# Patient Record
Sex: MALE | HISPANIC OR LATINO | URBAN - METROPOLITAN AREA
[De-identification: names, ages, dates, MRNs, and addresses within clinical notes are randomized per-mention and may not be internally consistent; named-entity substitution may affect disease eponyms.]

---

## 2018-06-20 ENCOUNTER — EMERGENCY (EMERGENCY)
Facility: HOSPITAL | Age: 33
LOS: 1 days | Discharge: HOME | End: 2018-06-20
Admitting: EMERGENCY MEDICINE
Payer: COMMERCIAL

## 2018-06-20 VITALS
RESPIRATION RATE: 18 BRPM | DIASTOLIC BLOOD PRESSURE: 88 MMHG | TEMPERATURE: 98 F | WEIGHT: 220.02 LBS | OXYGEN SATURATION: 98 % | HEART RATE: 91 BPM | SYSTOLIC BLOOD PRESSURE: 130 MMHG

## 2018-06-20 PROCEDURE — 99284 EMERGENCY DEPT VISIT MOD MDM: CPT

## 2018-06-20 PROCEDURE — 99284 EMERGENCY DEPT VISIT MOD MDM: CPT | Mod: 25

## 2018-06-20 PROCEDURE — 96374 THER/PROPH/DIAG INJ IV PUSH: CPT

## 2018-06-20 RX ORDER — LIDOCAINE 4 G/100G
1 CREAM TOPICAL
Qty: 10 | Refills: 0 | OUTPATIENT
Start: 2018-06-20 | End: 2018-06-24

## 2018-06-20 RX ORDER — IBUPROFEN 200 MG
1 TABLET ORAL
Qty: 21 | Refills: 0 | OUTPATIENT
Start: 2018-06-20 | End: 2018-06-26

## 2018-06-20 RX ORDER — CYCLOBENZAPRINE HYDROCHLORIDE 10 MG/1
10 TABLET, FILM COATED ORAL ONCE
Qty: 0 | Refills: 0 | Status: COMPLETED | OUTPATIENT
Start: 2018-06-20 | End: 2018-06-20

## 2018-06-20 RX ORDER — CYCLOBENZAPRINE HYDROCHLORIDE 10 MG/1
1 TABLET, FILM COATED ORAL
Qty: 10 | Refills: 0 | OUTPATIENT
Start: 2018-06-20 | End: 2018-06-24

## 2018-06-20 RX ORDER — KETOROLAC TROMETHAMINE 30 MG/ML
30 SYRINGE (ML) INJECTION ONCE
Qty: 0 | Refills: 0 | Status: DISCONTINUED | OUTPATIENT
Start: 2018-06-20 | End: 2018-06-20

## 2018-06-20 RX ORDER — LIDOCAINE 4 G/100G
1 CREAM TOPICAL ONCE
Qty: 0 | Refills: 0 | Status: COMPLETED | OUTPATIENT
Start: 2018-06-20 | End: 2018-06-20

## 2018-06-20 RX ADMIN — CYCLOBENZAPRINE HYDROCHLORIDE 10 MILLIGRAM(S): 10 TABLET, FILM COATED ORAL at 14:53

## 2018-06-20 RX ADMIN — Medication 30 MILLIGRAM(S): at 14:55

## 2018-06-20 RX ADMIN — LIDOCAINE 1 PATCH: 4 CREAM TOPICAL at 15:27

## 2018-06-20 NOTE — ED ADULT NURSE NOTE - OBJECTIVE STATEMENT
pt felt a pop in his neck last Monday when he was brushing his teeth and felt a sudden weakness in his right arm , it lasted a short period but this am was sleeping on a bus and afterwards had pain in right shoulder and pain in right upper back with tingling in right arm

## 2018-06-20 NOTE — ED PROVIDER NOTE - MEDICAL DECISION MAKING DETAILS
31 yo male in the ER with localized right upper back pain, reproducible and worse with movement.   no known injury, no neurovascular deficits, VSS, improved after muscle relaxant and NSAID's given.   Suspect musculoskeletal etiology. pt stable for discharge. returned precautions given.

## 2018-06-20 NOTE — ED PROVIDER NOTE - OBJECTIVE STATEMENT
31 yo male, w/o any significant PMH and not on any medications, in the Er with sharp, intermittent pain to huis right upper back, along the scapula line. Pt reports pain started last week, was improving slightly , but today he fell a sleep on the bus, with his neck down, when woke up and turned-felt sharp pain again. Pain radiate to his neck muscle and neck movement exacerbates pain, as well as movement of his right shoulder or right arm. Pt denies any numbness, tingling or weakness to his right UE, denies any SOB, chest pain or discomfort. Pt denies any [prior back or neck injury.

## 2018-06-20 NOTE — ED PROVIDER NOTE - MUSCULOSKELETAL, MLM
Spine appears normal, range of motion is not limited, no localized point of tenderness to cervical or thoracis spine, NROM, tenderness along the right scapula, NROM to right UE, no right shoulder deformity,

## 2018-06-24 DIAGNOSIS — M54.6 PAIN IN THORACIC SPINE: ICD-10-CM

## 2021-01-27 DIAGNOSIS — Z23 ENCOUNTER FOR IMMUNIZATION: ICD-10-CM

## 2021-02-10 ENCOUNTER — IMMUNIZATIONS (OUTPATIENT)
Dept: FAMILY MEDICINE CLINIC | Facility: HOSPITAL | Age: 36
End: 2021-02-10

## 2021-02-10 DIAGNOSIS — Z23 ENCOUNTER FOR IMMUNIZATION: Primary | ICD-10-CM

## 2021-02-10 PROCEDURE — 0011A SARS-COV-2 / COVID-19 MRNA VACCINE (MODERNA) 100 MCG: CPT

## 2021-02-10 PROCEDURE — 91301 SARS-COV-2 / COVID-19 MRNA VACCINE (MODERNA) 100 MCG: CPT

## 2021-03-12 ENCOUNTER — IMMUNIZATIONS (OUTPATIENT)
Dept: FAMILY MEDICINE CLINIC | Facility: HOSPITAL | Age: 36
End: 2021-03-12

## 2021-03-12 DIAGNOSIS — Z23 ENCOUNTER FOR IMMUNIZATION: Primary | ICD-10-CM

## 2021-03-12 PROCEDURE — 91301 SARS-COV-2 / COVID-19 MRNA VACCINE (MODERNA) 100 MCG: CPT

## 2021-03-12 PROCEDURE — 0012A SARS-COV-2 / COVID-19 MRNA VACCINE (MODERNA) 100 MCG: CPT

## 2021-06-03 ENCOUNTER — OFFICE VISIT (OUTPATIENT)
Dept: URGENT CARE | Facility: CLINIC | Age: 36
End: 2021-06-03
Payer: COMMERCIAL

## 2021-06-03 VITALS
RESPIRATION RATE: 16 BRPM | TEMPERATURE: 97.4 F | BODY MASS INDEX: 31.64 KG/M2 | HEIGHT: 71 IN | OXYGEN SATURATION: 99 % | SYSTOLIC BLOOD PRESSURE: 113 MMHG | HEART RATE: 73 BPM | DIASTOLIC BLOOD PRESSURE: 59 MMHG | WEIGHT: 226 LBS

## 2021-06-03 DIAGNOSIS — H10.31 ACUTE CONJUNCTIVITIS OF RIGHT EYE, UNSPECIFIED ACUTE CONJUNCTIVITIS TYPE: Primary | ICD-10-CM

## 2021-06-03 DIAGNOSIS — J30.9 ALLERGIC RHINITIS, UNSPECIFIED SEASONALITY, UNSPECIFIED TRIGGER: ICD-10-CM

## 2021-06-03 PROCEDURE — G0382 LEV 3 HOSP TYPE B ED VISIT: HCPCS | Performed by: NURSE PRACTITIONER

## 2021-06-03 RX ORDER — POLYMYXIN B SULFATE AND TRIMETHOPRIM 1; 10000 MG/ML; [USP'U]/ML
1 SOLUTION OPHTHALMIC EVERY 4 HOURS
Qty: 10 ML | Refills: 0 | Status: SHIPPED | OUTPATIENT
Start: 2021-06-03

## 2021-06-03 RX ORDER — FLUTICASONE PROPIONATE 50 MCG
2 SPRAY, SUSPENSION (ML) NASAL DAILY
COMMUNITY
Start: 2020-11-23 | End: 2021-11-23

## 2021-06-03 NOTE — PROGRESS NOTES
[facility]        NAME: Ghanshyam Salcido is a 30 y o  male  : 1985    MRN: [id number]  DATE: Leonie 3, 2021  TIME: 9:23 AM    Assessment and Plan   Acute conjunctivitis of right eye, unspecified acute conjunctivitis type [H10 31]  1  Acute conjunctivitis of right eye, unspecified acute conjunctivitis type  polymyxin b-trimethoprim (POLYTRIM) ophthalmic solution   2  Allergic rhinitis, unspecified seasonality, unspecified trigger           Patient Instructions     --Use Rx eye drops x 5-7 days as prescribed  No contact use during this time  --Warm compresses  --OTC antihistamine-decongestant (Claritin-D, etc)  --OTC saline nasal spray during day and steroid nasal spray (Flonase, etc) at bedtime  --Contagion issues discussed  Stay home from work at least 24 hours/  --Seek re-evalualtion if no improvement/worsening over the next 48-72 hours  Chief Complaint     Chief Complaint   Patient presents with    Eye Pain     right eye feels idalia/rubbing/white bump inside eye/green discharge x 2 days, now left eye feels irritated    Sinusitis     sinus pressure x last night, sneezing, back of throat is dry         History of Present Illness         Here with complaints of right eye irritated, red x 2 days  Minimal itching  Some green exudate noted, as well as some tearing  Left eye mildly irritated also, but no redness/drainage noted here  No known FB, however, may have contaminated his eye 3 days ago when he took his contacts out  Hasn't worn them since  Nasal/sinus congestion, green rhinorrhea since yesterday  Some sneezing  No fever, cough, sore throat, ear pain, anosmia  Admits to history of spring allergies  No OTC meds taken  Review of Systems   Review of Systems   Constitutional: Negative for fever  HENT: Positive for rhinorrhea and sinus pressure  Negative for ear pain and sinus pain  Eyes: Positive for discharge and redness   Negative for photophobia and visual disturbance  Respiratory: Negative for cough  Neurological: Negative for headaches  Current Medications       Current Outpatient Medications:     fluticasone (FLONASE) 50 mcg/act nasal spray, 2 sprays into each nostril daily, Disp: , Rfl:     polymyxin b-trimethoprim (POLYTRIM) ophthalmic solution, Administer 1 drop to the right eye every 4 (four) hours, Disp: 10 mL, Rfl: 0    Current Allergies     Allergies as of 06/03/2021    (No Known Allergies)            The following portions of the patient's history were reviewed and updated as appropriate: allergies, current medications, past family history, past medical history, past social history, past surgical history and problem list      No past medical history on file  No past surgical history on file  No family history on file  Medications have been verified  Objective   /59   Pulse 73   Temp (!) 97 4 °F (36 3 °C)   Resp 16   Ht 5' 11" (1 803 m)   Wt 103 kg (226 lb)   SpO2 99%   BMI 31 52 kg/m²   No LMP for male patient  Physical Exam     Physical Exam  Constitutional:       General: He is not in acute distress  Appearance: He is well-developed  He is not diaphoretic  HENT:      Head: Normocephalic and atraumatic  Right Ear: External ear normal       Left Ear: External ear normal       Nose: Congestion and rhinorrhea present  Comments: Sinus pressure, but no appreciable tenderness at this time  Mouth/Throat:      Pharynx: No oropharyngeal exudate or posterior oropharyngeal erythema  Comments: Tonsils pink, 2-3+ (baseline, per patient)  No exudate  Eyes:      General:         Right eye: Discharge present  Left eye: No discharge  Extraocular Movements: Extraocular movements intact  Pupils: Pupils are equal, round, and reactive to light  Comments: Right eye with mild conjunctival and peripheral scleral injection with small amount of tearing noted   No crusting or purulent drainage at this time  Small hordeolum with surrounding erythema starting to form at margin of lower medial lid  Remainder of lid non-tender, with normal appearance  No ciliary flush  Left eye with normal appearance  Cardiovascular:      Rate and Rhythm: Normal rate and regular rhythm  Heart sounds: Normal heart sounds  Pulmonary:      Effort: Pulmonary effort is normal  No respiratory distress  Breath sounds: Normal breath sounds  No stridor  No wheezing, rhonchi or rales  Chest:      Chest wall: No tenderness  Lymphadenopathy:      Cervical: No cervical adenopathy  Skin:     General: Skin is warm and dry  Findings: No rash  Neurological:      Mental Status: He is alert and oriented to person, place, and time

## 2021-06-03 NOTE — PATIENT INSTRUCTIONS
--Use Rx eye drops x 5-7 days as prescribed  No contact use during this time  --Warm compresses  --OTC antihistamine-decongestant (Claritin-D, etc)  --OTC saline nasal spray during day and steroid nasal spray (Flonase, etc) at bedtime  --Contagion issues discussed  Stay home from work at least 24 hours/  --Seek re-evalualtion if no improvement/worsening over the next 48-72 hours

## 2021-06-03 NOTE — LETTER
Leonie 3, 2021     Patient: Laura Betancourt   YOB: 1985   Date of Visit: 6/3/2021       To Whom it May Concern:    Laura Betancourt was seen in my clinic on 6/3/2021  Please excuse from work today (6/3) and tomorrow (6/4) due to medical condition  If you have any questions or concerns, please don't hesitate to call           Sincerely,          BE DARRYL JONES        CC: Laura Betancourt

## 2021-08-12 ENCOUNTER — HOSPITAL ENCOUNTER (EMERGENCY)
Facility: HOSPITAL | Age: 36
Discharge: HOME | End: 2021-08-12
Attending: EMERGENCY MEDICINE | Admitting: EMERGENCY MEDICINE
Payer: COMMERCIAL

## 2021-08-12 ENCOUNTER — APPOINTMENT (EMERGENCY)
Dept: VASCULAR ULTRASOUND | Facility: HOSPITAL | Age: 36
End: 2021-08-12
Payer: COMMERCIAL

## 2021-08-12 ENCOUNTER — OFFICE VISIT (OUTPATIENT)
Dept: URGENT CARE | Facility: CLINIC | Age: 36
End: 2021-08-12
Payer: COMMERCIAL

## 2021-08-12 VITALS
HEIGHT: 70 IN | WEIGHT: 217 LBS | DIASTOLIC BLOOD PRESSURE: 56 MMHG | TEMPERATURE: 97 F | HEART RATE: 71 BPM | BODY MASS INDEX: 31.07 KG/M2 | RESPIRATION RATE: 16 BRPM | SYSTOLIC BLOOD PRESSURE: 109 MMHG

## 2021-08-12 VITALS
SYSTOLIC BLOOD PRESSURE: 126 MMHG | TEMPERATURE: 98.8 F | RESPIRATION RATE: 16 BRPM | BODY MASS INDEX: 31.42 KG/M2 | DIASTOLIC BLOOD PRESSURE: 80 MMHG | HEART RATE: 91 BPM | OXYGEN SATURATION: 99 % | WEIGHT: 219 LBS

## 2021-08-12 DIAGNOSIS — S40.022A CONTUSION OF LEFT UPPER EXTREMITY, INITIAL ENCOUNTER: ICD-10-CM

## 2021-08-12 DIAGNOSIS — R59.1 LYMPHADENOPATHY: Primary | ICD-10-CM

## 2021-08-12 DIAGNOSIS — M79.602 PAIN OF LEFT UPPER EXTREMITY: ICD-10-CM

## 2021-08-12 DIAGNOSIS — T14.8XXA CONTUSION: ICD-10-CM

## 2021-08-12 DIAGNOSIS — R59.0 AXILLARY ADENOPATHY: Primary | ICD-10-CM

## 2021-08-12 DIAGNOSIS — M79.602 LEFT ARM PAIN: ICD-10-CM

## 2021-08-12 LAB
ALBUMIN SERPL BCP-MCNC: 4.2 G/DL (ref 3.4–4.8)
ALP SERPL-CCNC: 58.7 U/L (ref 10–129)
ALT SERPL W P-5'-P-CCNC: 15 U/L (ref 5–63)
ANION GAP SERPL CALCULATED.3IONS-SCNC: 7 MMOL/L (ref 4–13)
APTT PPP: 32 SECONDS (ref 23–31)
AST SERPL W P-5'-P-CCNC: 17 U/L (ref 15–41)
BASOPHILS # BLD AUTO: 0.04 THOUSANDS/ΜL (ref 0–0.1)
BASOPHILS NFR BLD AUTO: 0 % (ref 0–1)
BILIRUB SERPL-MCNC: 0.57 MG/DL (ref 0.3–1.2)
BUN SERPL-MCNC: 15 MG/DL (ref 6–20)
CALCIUM SERPL-MCNC: 9.2 MG/DL (ref 8.4–10.2)
CHLORIDE SERPL-SCNC: 103 MMOL/L (ref 96–108)
CO2 SERPL-SCNC: 27 MMOL/L (ref 22–33)
CREAT SERPL-MCNC: 0.93 MG/DL (ref 0.5–1.2)
EOSINOPHIL # BLD AUTO: 0.2 THOUSAND/ΜL (ref 0–0.61)
EOSINOPHIL NFR BLD AUTO: 2 % (ref 0–6)
ERYTHROCYTE [DISTWIDTH] IN BLOOD BY AUTOMATED COUNT: 13.7 % (ref 11.6–15.1)
GFR SERPL CREATININE-BSD FRML MDRD: 106 ML/MIN/1.73SQ M
GLUCOSE SERPL-MCNC: 131 MG/DL (ref 65–140)
HCT VFR BLD AUTO: 43.2 % (ref 36.5–49.3)
HGB BLD-MCNC: 14.5 G/DL (ref 12–17)
IMM GRANULOCYTES # BLD AUTO: 0.07 THOUSAND/UL (ref 0–0.2)
IMM GRANULOCYTES NFR BLD AUTO: 1 % (ref 0–2)
INR PPP: 0.96 (ref 0.9–1.1)
LYMPHOCYTES # BLD AUTO: 2.4 THOUSANDS/ΜL (ref 0.6–4.47)
LYMPHOCYTES NFR BLD AUTO: 24 % (ref 14–44)
MCH RBC QN AUTO: 31 PG (ref 26.8–34.3)
MCHC RBC AUTO-ENTMCNC: 33.6 G/DL (ref 31.4–37.4)
MCV RBC AUTO: 93 FL (ref 82–98)
MONOCYTES # BLD AUTO: 0.74 THOUSAND/ΜL (ref 0.17–1.22)
MONOCYTES NFR BLD AUTO: 7 % (ref 4–12)
NEUTROPHILS # BLD AUTO: 6.77 THOUSANDS/ΜL (ref 1.85–7.62)
NEUTS SEG NFR BLD AUTO: 66 % (ref 43–75)
PLATELET # BLD AUTO: 272 THOUSANDS/UL (ref 149–390)
PMV BLD AUTO: 10.5 FL (ref 8.9–12.7)
POTASSIUM SERPL-SCNC: 4.1 MMOL/L (ref 3.5–5)
PROT SERPL-MCNC: 7.2 G/DL (ref 6.4–8.3)
PROTHROMBIN TIME: 10.9 SECONDS (ref 9.5–12.1)
RBC # BLD AUTO: 4.67 MILLION/UL (ref 3.88–5.62)
SODIUM SERPL-SCNC: 137 MMOL/L (ref 133–145)
WBC # BLD AUTO: 10.22 THOUSAND/UL (ref 4.31–10.16)

## 2021-08-12 PROCEDURE — 85025 COMPLETE CBC W/AUTO DIFF WBC: CPT | Performed by: EMERGENCY MEDICINE

## 2021-08-12 PROCEDURE — 93971 EXTREMITY STUDY: CPT

## 2021-08-12 PROCEDURE — 85610 PROTHROMBIN TIME: CPT | Performed by: EMERGENCY MEDICINE

## 2021-08-12 PROCEDURE — 99282 EMERGENCY DEPT VISIT SF MDM: CPT | Performed by: EMERGENCY MEDICINE

## 2021-08-12 PROCEDURE — 93971 EXTREMITY STUDY: CPT | Performed by: SURGERY

## 2021-08-12 PROCEDURE — G0382 LEV 3 HOSP TYPE B ED VISIT: HCPCS | Performed by: NURSE PRACTITIONER

## 2021-08-12 PROCEDURE — 36415 COLL VENOUS BLD VENIPUNCTURE: CPT | Performed by: EMERGENCY MEDICINE

## 2021-08-12 PROCEDURE — 80053 COMPREHEN METABOLIC PANEL: CPT | Performed by: EMERGENCY MEDICINE

## 2021-08-12 PROCEDURE — 85730 THROMBOPLASTIN TIME PARTIAL: CPT | Performed by: EMERGENCY MEDICINE

## 2021-08-12 PROCEDURE — 99284 EMERGENCY DEPT VISIT MOD MDM: CPT

## 2021-08-12 NOTE — PROGRESS NOTES
[facility]        NAME: Michell Smiley is a 29 y o  male  : 1985    MRN: [id number]  DATE: 2021  TIME: 2:08 PM    Assessment and Plan   Axillary adenopathy [R59 0]  1  Axillary adenopathy     2  Contusion of left upper extremity, initial encounter     3  Pain of left upper extremity  Ambulatory Referral to Emergency Medicine    Transfer to other facility         Patient Instructions     --Advise f/u with PCP within the next 1-2 days for further evaluation and testing including possible bloodwork and ultrasound  Go to ER if unable to secure appointment during this time frame and/or worsening symptoms  Chief Complaint     Chief Complaint   Patient presents with    Mass     under L armpit, discomfort, started 2 weeks ago  Now today has one on inner L upper arm  History of Present Illness       Here with complaints of tender "lumps" under left arm pit x 2 weeks  Tender bruised area noted on left upper inner arm since yesterday  Remainder of arm starting to feel somewhat painful also  Tired, but no associated fever/chills, headache, body aches, swollen glands elsewhere including right arm  No Motrin, ice/heat  No known bites, stings, trauma  No cat scratches  Had COVID vaccine this past spring  Review of Systems   Review of Systems   Constitutional: Negative for fever     Musculoskeletal:        Per HPI   Skin:        Per HPI         Current Medications       Current Outpatient Medications:     fluticasone (FLONASE) 50 mcg/act nasal spray, 2 sprays into each nostril daily (Patient not taking: Reported on 2021), Disp: , Rfl:     polymyxin b-trimethoprim (POLYTRIM) ophthalmic solution, Administer 1 drop to the right eye every 4 (four) hours (Patient not taking: Reported on 2021), Disp: 10 mL, Rfl: 0    Current Allergies     Allergies as of 2021    (No Known Allergies)            The following portions of the patient's history were reviewed and updated as appropriate: allergies, current medications, past family history, past medical history, past social history, past surgical history and problem list      No past medical history on file  No past surgical history on file  No family history on file  Medications have been verified  Objective   /56 (Patient Position: Sitting)   Pulse 71   Temp (!) 97 °F (36 1 °C) (Temporal)   Resp 16   Ht 5' 10" (1 778 m)   Wt 98 4 kg (217 lb)   BMI 31 14 kg/m²   No LMP for male patient  Physical Exam     Physical Exam  Constitutional:       General: He is not in acute distress  Appearance: He is not ill-appearing, toxic-appearing or diaphoretic  Pulmonary:      Effort: Pulmonary effort is normal    Musculoskeletal:      Comments: Left axillary adenopathy and tenderness noted with no overlying skin changes  Left upper inner biceps region with 3-4 cm ovoid area of pronounced bruising with tender, 5 mm SQ nodule in center with overlying yellowish coloration  No FB, bite barks, drainage noted  Remainder of left arm non-tender with normal appearance  Neurological:      Mental Status: He is alert     Psychiatric:         Mood and Affect: Mood normal

## 2021-08-12 NOTE — PATIENT INSTRUCTIONS
--Advise f/u with PCP within the next 1-2 days for further evaluation and testing including possible bloodwork and ultrasound  Go to ER if unable to secure appointment during this time frame and/or worsening symptoms

## 2021-08-15 NOTE — ED PROVIDER NOTES
History  Chief Complaint   Patient presents with    Arm Pain     Pt reports lumps under his left axilliary  Pt reports overnight started with a bruise there also  This is a 45-year-old male presents the emergency department complaining pain underneath his right armpit  Patient states over last few weeks he has noticed bumps under the armpit  It has become more painful  He states the pain is located under the armpit does not radiate  Nothing makes it better or worse  He has not tried any make it better or worse  He states the pain is mild in nature and sharp  He also notes that there is a spot on his left biceps that is now bruised has a spot inside of  Patient denies chest pain or trouble breathing  He denies nausea or vomiting  He denies a fever  Prior to Admission Medications   Prescriptions Last Dose Informant Patient Reported? Taking?   fluticasone (FLONASE) 50 mcg/act nasal spray  Self Yes No   Si sprays into each nostril daily   Patient not taking: Reported on 2021   polymyxin b-trimethoprim (POLYTRIM) ophthalmic solution  Self No No   Sig: Administer 1 drop to the right eye every 4 (four) hours   Patient not taking: Reported on 2021      Facility-Administered Medications: None       History reviewed  No pertinent past medical history  Past Surgical History:   Procedure Laterality Date    FRACTURE SURGERY Left     tib/fib       History reviewed  No pertinent family history  I have reviewed and agree with the history as documented      E-Cigarette/Vaping    E-Cigarette Use Never User      E-Cigarette/Vaping Substances     Social History     Tobacco Use    Smoking status: Current Every Day Smoker     Packs/day: 0 50     Types: Cigarettes    Smokeless tobacco: Never Used   Vaping Use    Vaping Use: Never used   Substance Use Topics    Alcohol use: Yes     Comment: few times a month    Drug use: Yes     Types: Marijuana       Review of Systems   All other systems reviewed and are negative  Physical Exam  Physical Exam  Constitutional:  Vital signs reviewed, patient appears nontoxic, no acute distress  Eyes: Pupils equal round reactive to light and accommodation, extraocular muscles intact  HEENT: trachea midline, no JVD, moist mucous membranes  Respiratory: lung sounds clear throughout, no rhonchi, no rales  Cardiovascular: regular rate rhythm, no murmurs or rubs  Abdomen: soft, nontender, nondistended, no rebound or guarding  Back: no CVA tenderness, normal inspection  Extremities: no edema, pulses equal in all 4 extremities  Neuro: awake, alert, oriented, no focal weakness  Skin: warm, dry, intact, 2 cm hematoma to the left biceps with a center lump    Positive lymphadenopathy in the axillary region    Vital Signs  ED Triage Vitals [08/12/21 1525]   Temperature Pulse Respirations Blood Pressure SpO2   98 8 °F (37 1 °C) 91 16 126/80 99 %      Temp Source Heart Rate Source Patient Position - Orthostatic VS BP Location FiO2 (%)   Oral Monitor -- -- --      Pain Score       --           Vitals:    08/12/21 1525   BP: 126/80   Pulse: 91         Visual Acuity      ED Medications  Medications - No data to display    Diagnostic Studies  Results Reviewed     Procedure Component Value Units Date/Time    Protime-INR [[id number]]  (Normal) Collected: 08/12/21 1618    Lab Status: Final result Specimen: Blood from Arm, Right Updated: 08/12/21 1651     Protime 10 9 seconds      INR 0 96    Narrative:      INR Reference Ranges:  No Anticoagulant, Normal:           0 9-1 1  Standard Dose, Oral Anticoagulant:  2 0-3 0  High Dose, Oral Anticoagulant:      2 5-3 5    APTT [[id number]]  (Abnormal) Collected: 08/12/21 1618    Lab Status: Final result Specimen: Blood from Arm, Right Updated: 08/12/21 1651     PTT 32 seconds     Comprehensive metabolic panel [[id number]] Collected: 08/12/21 1618    Lab Status: Final result Specimen: Blood from Arm, Right Updated: 08/12/21 1643     Sodium 137 mmol/L      Potassium 4 1 mmol/L      Chloride 103 mmol/L      CO2 27 mmol/L      ANION GAP 7 mmol/L      BUN 15 mg/dL      Creatinine 0 93 mg/dL      Glucose 131 mg/dL      Calcium 9 2 mg/dL      AST 17 U/L      ALT 15 U/L      Alkaline Phosphatase 58 7 U/L      Total Protein 7 2 g/dL      Albumin 4 2 g/dL      Total Bilirubin 0 57 mg/dL      eGFR 106 ml/min/1 73sq m     Narrative:      National Kidney Disease Foundation guidelines for Chronic Kidney Disease (CKD):     Stage 1 with normal or high GFR (GFR > 90 mL/min/1 73 square meters)    Stage 2 Mild CKD (GFR = 60-89 mL/min/1 73 square meters)    Stage 3A Moderate CKD (GFR = 45-59 mL/min/1 73 square meters)    Stage 3B Moderate CKD (GFR = 30-44 mL/min/1 73 square meters)    Stage 4 Severe CKD (GFR = 15-29 mL/min/1 73 square meters)    Stage 5 End Stage CKD (GFR <15 mL/min/1 73 square meters)  Note: GFR calculation is accurate only with a steady state creatinine    CBC and differential [[id number]]  (Abnormal) Collected: 08/12/21 1618    Lab Status: Final result Specimen: Blood from Arm, Right Updated: 08/12/21 1624     WBC 10 22 Thousand/uL      RBC 4 67 Million/uL      Hemoglobin 14 5 g/dL      Hematocrit 43 2 %      MCV 93 fL      MCH 31 0 pg      MCHC 33 6 g/dL      RDW 13 7 %      MPV 10 5 fL      Platelets 983 Thousands/uL      Neutrophils Relative 66 %      Immat GRANS % 1 %      Lymphocytes Relative 24 %      Monocytes Relative 7 %      Eosinophils Relative 2 %      Basophils Relative 0 %      Neutrophils Absolute 6 77 Thousands/µL      Immature Grans Absolute 0 07 Thousand/uL      Lymphocytes Absolute 2 40 Thousands/µL      Monocytes Absolute 0 74 Thousand/µL      Eosinophils Absolute 0 20 Thousand/µL      Basophils Absolute 0 04 Thousands/µL                  VAS upper limb venous duplex scan, unilateral/limited   Final Result by Sourav Rodriguez MD (08/12 1467)                 Procedures  Procedures         ED Course  ED Course as of Aug 15 0616   Thu Aug 12, 2021   1626 As per the vascular technician is read:  "No acute DVT"                                              MDM  Number of Diagnoses or Management Options  Contusion  Left arm pain  Lymphadenopathy  Diagnosis management comments: This is a 29year old male who presented to the ED with L arm pain and bruise  I considered DVT, bruise, lymphadenopathy  These and other diagnoses were considered  Amount and/or Complexity of Data Reviewed  Clinical lab tests: reviewed and ordered  Tests in the radiology section of CPT®: reviewed and ordered  Discussion of test results with the performing providers: yes (Vascular tech  )        Disposition  Final diagnoses:   Left arm pain   Lymphadenopathy   Contusion     Time reflects when diagnosis was documented in both MDM as applicable and the Disposition within this note     Time User Action Codes Description Comment    8/12/2021  5:53 PM Alben Madina Add [M86 846] Left arm pain     8/12/2021  5:53 PM Alben Madina Add [R59 1] Lymphadenopathy     8/12/2021  5:53 PM Alben Madina Modify [Z32 700] Left arm pain     8/12/2021  5:53 PM Alben Madina Modify [R59 1] Lymphadenopathy     8/12/2021  5:53 PM Faccio, Constancia Brenna  8XXA] Contusion       ED Disposition     ED Disposition Condition Date/Time Comment    Discharge Stable Thu Aug 12, 2021  5:53 PM Shannon Going discharge to home/self care              Follow-up Information     Follow up With Specialties Details Why Contact Info Additional [address]  Emergency Department Emergency Medicine  If symptoms worsen [address] [facility],[address] [id number]  [address] [place] [address] Emergency Department, [address] [place] [address]    [facility]  Schedule an appointment as soon as possible for a visit in 2 days For PCP appointment [phone]             Discharge Medication List as of 8/12/2021  5:54 PM      CONTINUE these medications which have NOT CHANGED    Details   fluticasone (FLONASE) 50 mcg/act nasal spray 2 sprays into each nostril daily, Starting Mon 11/23/2020, Until Tue 11/23/2021, Historical Med      polymyxin b-trimethoprim (POLYTRIM) ophthalmic solution Administer 1 drop to the right eye every 4 (four) hours, Starting Thu 6/3/2021, Normal           No discharge procedures on file      PDMP Review     None          ED Provider  Electronically Signed by           Diomedes Hernandez DO  08/15/21 0457

## 2021-12-29 ENCOUNTER — IMMUNIZATIONS (OUTPATIENT)
Dept: FAMILY MEDICINE CLINIC | Facility: HOSPITAL | Age: 36
End: 2021-12-29

## 2021-12-29 DIAGNOSIS — Z23 ENCOUNTER FOR IMMUNIZATION: Primary | ICD-10-CM

## 2021-12-29 PROCEDURE — 0064A COVID-19 MODERNA VACC 0.25 ML BOOSTER: CPT

## 2021-12-29 PROCEDURE — 91306 COVID-19 MODERNA VACC 0.25 ML BOOSTER: CPT

## 2022-11-16 ENCOUNTER — OFFICE VISIT (OUTPATIENT)
Dept: URGENT CARE | Facility: CLINIC | Age: 37
End: 2022-11-16

## 2022-11-16 VITALS
WEIGHT: 235 LBS | HEART RATE: 84 BPM | BODY MASS INDEX: 32.9 KG/M2 | RESPIRATION RATE: 16 BRPM | OXYGEN SATURATION: 97 % | TEMPERATURE: 97.7 F | HEIGHT: 71 IN

## 2022-11-16 DIAGNOSIS — J02.9 SORE THROAT: Primary | ICD-10-CM

## 2022-11-16 LAB — S PYO AG THROAT QL: NEGATIVE

## 2022-11-16 RX ORDER — PREDNISONE 20 MG/1
40 TABLET ORAL DAILY
Qty: 10 TABLET | Refills: 0 | Status: SHIPPED | OUTPATIENT
Start: 2022-11-16

## 2022-11-16 RX ORDER — FLUTICASONE PROPIONATE 50 MCG
1 SPRAY, SUSPENSION (ML) NASAL DAILY
Qty: 9.9 ML | Refills: 0 | Status: SHIPPED | OUTPATIENT
Start: 2022-11-16

## 2022-11-16 NOTE — LETTER
November 16, 2022     Patient: Edilma Ge   YOB: 1985   Date of Visit: 11/16/2022       To Whom it May Concern:    Edilma Ge was seen in my clinic on 11/16/2022  He may return to work on 11/17/2022  If you have any questions or concerns, please don't hesitate to call           Sincerely,          BE DARRYL JONES        CC: No Recipients

## 2022-11-16 NOTE — PROGRESS NOTES
[facility]        NAME: Ardell Osgood is a 40 y o  male  : 1985    MRN: [id number]  DATE: 2022  TIME: 10:43 AM    Assessment and Plan   Sore throat [J02 9]  1  Sore throat  predniSONE 20 mg tablet    fluticasone (FLONASE) 50 mcg/act nasal spray    POCT rapid strepA            Patient Instructions   Prednisone daily for 5 days   Increase fluid intake  Flonase 1 spray each nostril daily   Rapid strep: negative  Tylenol/Motrin as needed for pain/fever   Throat lozenges, honey, salt water gargles for throat discomfort   Follow up with your PCP for worsening or concerning symptoms    Follow up with PCP in 3-5 days  Proceed to  ER if symptoms worsen  Chief Complaint     Chief Complaint   Patient presents with   • Sore Throat     Tonsils are swollen, dry throat, hard to swallow- started on Monday  Tylenol did not help with the inflammation  History of Present Illness       Patient is a 55-year-old male presenting with feelings of throat swelling for the past 2 days  He reports sinus congestion and some postnasal drip  Denies fever or chills  He took Tylenol with no improvement  Sore Throat   Associated symptoms include congestion  Pertinent negatives include no coughing, ear discharge, ear pain, headaches or shortness of breath  Review of Systems   Review of Systems   Constitutional: Negative for activity change, chills and fever  HENT: Positive for congestion, postnasal drip and sore throat  Negative for ear discharge and ear pain  Respiratory: Negative for cough and shortness of breath  Skin: Negative for rash  Neurological: Negative for headaches           Current Medications       Current Outpatient Medications:   •  fluticasone (FLONASE) 50 mcg/act nasal spray, 1 spray into each nostril daily, Disp: 9 9 mL, Rfl: 0  •  predniSONE 20 mg tablet, Take 2 tablets (40 mg total) by mouth daily, Disp: 10 tablet, Rfl: 0  •  fluticasone (FLONASE) 50 mcg/act nasal spray, 2 sprays into each nostril daily (Patient not taking: Reported on 8/12/2021), Disp: , Rfl:   •  polymyxin b-trimethoprim (POLYTRIM) ophthalmic solution, Administer 1 drop to the right eye every 4 (four) hours (Patient not taking: Reported on 8/12/2021), Disp: 10 mL, Rfl: 0    Current Allergies     Allergies as of 11/16/2022   • (No Known Allergies)            The following portions of the patient's history were reviewed and updated as appropriate: allergies, current medications, past family history, past medical history, past social history, past surgical history and problem list      No past medical history on file  Past Surgical History:   Procedure Laterality Date   • FRACTURE SURGERY Left     tib/fib       No family history on file  Medications have been verified  Objective   Pulse 84   Temp 97 7 °F (36 5 °C)   Resp 16   Ht 5' 11" (1 803 m)   Wt 107 kg (235 lb)   SpO2 97%   BMI 32 78 kg/m²     Rapid strep: negative    Physical Exam     Physical Exam  Vitals reviewed  Constitutional:       General: He is awake  He is not in acute distress  Appearance: Normal appearance  He is well-developed and normal weight  HENT:      Head: Normocephalic  Right Ear: Hearing, tympanic membrane, ear canal and external ear normal       Left Ear: Hearing, tympanic membrane, ear canal and external ear normal       Nose: Congestion present  Mouth/Throat:      Lips: Pink  Pharynx: Pharyngeal swelling and posterior oropharyngeal erythema present  Tonsils: No tonsillar exudate  Cardiovascular:      Rate and Rhythm: Normal rate and regular rhythm  Heart sounds: Normal heart sounds, S1 normal and S2 normal    Pulmonary:      Effort: Pulmonary effort is normal       Breath sounds: Normal breath sounds  No decreased breath sounds, wheezing, rhonchi or rales  Skin:     General: Skin is warm and moist    Neurological:      General: No focal deficit present        Mental Status: He is alert, oriented to person, place, and time and easily aroused  Psychiatric:         Behavior: Behavior is cooperative

## 2022-11-16 NOTE — PATIENT INSTRUCTIONS
Prednisone daily for 5 days   Increase fluid intake  Flonase 1 spray each nostril daily   Rapid strep: negative  Tylenol/Motrin as needed for pain/fever   Throat lozenges, honey, salt water gargles for throat discomfort   Follow up with your PCP for worsening or concerning symptoms    Pharyngitis   WHAT YOU NEED TO KNOW:   Pharyngitis, or sore throat, is inflammation of the tissues and structures in your pharynx (throat)  Pharyngitis is most often caused by bacteria  It may also be caused by a cold or flu virus  Other causes include smoking, allergies, or acid reflux  DISCHARGE INSTRUCTIONS:   Call 911 for any of the following: You have trouble breathing or swallowing because your throat is swollen or sore  Return to the emergency department if:   You are drooling because it hurts too much to swallow  Your fever is higher than 102? F (39?C) or lasts longer than 3 days  You are confused  You taste blood in your throat  Contact your healthcare provider if:   Your throat pain gets worse  You have a painful lump in your throat that does not go away after 5 days  Your symptoms do not improve after 5 days  You have questions or concerns about your condition or care  Medicines:  Viral pharyngitis will go away on its own without treatment  Your sore throat should start to feel better in 3 to 5 days for both viral and bacterial infections  You may need any of the following:  Antibiotics  treat a bacterial infection  NSAIDs , such as ibuprofen, help decrease swelling, pain, and fever  NSAIDs can cause stomach bleeding or kidney problems in certain people  If you take blood thinner medicine, always ask your healthcare provider if NSAIDs are safe for you  Always read the medicine label and follow directions  Acetaminophen  decreases pain and fever  It is available without a doctor's order  Ask how much to take and how often to take it  Follow directions   Acetaminophen can cause liver damage if not taken correctly  Take your medicine as directed  Contact your healthcare provider if you think your medicine is not helping or if you have side effects  Tell him or her if you are allergic to any medicine  Keep a list of the medicines, vitamins, and herbs you take  Include the amounts, and when and why you take them  Bring the list or the pill bottles to follow-up visits  Carry your medicine list with you in case of an emergency  Manage your symptoms:   Gargle salt water  Mix ¼ teaspoon salt in an 8 ounce glass of warm water and gargle  This may help decrease swelling in your throat  Drink liquids as directed  You may need to drink more liquids than usual  Liquids may help soothe your throat and prevent dehydration  Ask how much liquid to drink each day and which liquids are best for you  Use a cool-steam humidifier  to help moisten the air in your room and calm your cough  Soothe your throat  with cough drops, ice, soft foods, or popsicles  Prevent the spread of pharyngitis:  Cover your mouth and nose when you cough or sneeze  Do not share food or drinks  Wash your hands often  Use soap and water  If soap and water are unavailable, use an alcohol based hand   Follow up with your doctor as directed:  Write down your questions so you remember to ask them during your visits  © Copyright [employer] 2022 Information is for End User's use only and may not be sold, redistributed or otherwise used for commercial purposes  All illustrations and images included in CareNotes® are the copyrighted property of A D A M , Inc  or [facility] Christopher Cobian   The above information is an  only  It is not intended as medical advice for individual conditions or treatments  Talk to your doctor, nurse or pharmacist before following any medical regimen to see if it is safe and effective for you

## 2022-11-19 LAB — BACTERIA THROAT CULT: ABNORMAL

## 2023-01-09 ENCOUNTER — TELEPHONE (OUTPATIENT)
Dept: FAMILY MEDICINE CLINIC | Facility: CLINIC | Age: 38
End: 2023-01-09

## 2023-01-09 NOTE — TELEPHONE ENCOUNTER
Pt called and requested a refill of Valacyclovir due to an outbreak  Last seen 3/2022       Please advise if you want to do a virtual visit

## 2023-01-10 DIAGNOSIS — A60.00 HERPES SIMPLEX INFECTION OF GENITOURINARY SYSTEM: Primary | ICD-10-CM

## 2023-01-10 RX ORDER — VALACYCLOVIR HYDROCHLORIDE 500 MG/1
500 TABLET, FILM COATED ORAL 2 TIMES DAILY
Qty: 30 TABLET | Refills: 0 | Status: SHIPPED | OUTPATIENT
Start: 2023-01-10 | End: 2023-01-25

## 2023-01-22 ENCOUNTER — HOSPITAL ENCOUNTER (EMERGENCY)
Facility: HOSPITAL | Age: 38
End: 2023-01-23
Attending: EMERGENCY MEDICINE

## 2023-01-22 DIAGNOSIS — R45.851 SUICIDAL IDEATIONS: Primary | ICD-10-CM

## 2023-01-22 LAB
AMPHETAMINES SERPL QL SCN: NEGATIVE
BARBITURATES UR QL: NEGATIVE
BENZODIAZ UR QL: NEGATIVE
COCAINE UR QL: NEGATIVE
ETHANOL EXG-MCNC: 0.12 MG/DL
FLUAV RNA RESP QL NAA+PROBE: NEGATIVE
FLUBV RNA RESP QL NAA+PROBE: NEGATIVE
METHADONE UR QL: NEGATIVE
OPIATES UR QL SCN: NEGATIVE
OXYCODONE+OXYMORPHONE UR QL SCN: NEGATIVE
PCP UR QL: NEGATIVE
RSV RNA RESP QL NAA+PROBE: NEGATIVE
SARS-COV-2 RNA RESP QL NAA+PROBE: NEGATIVE
THC UR QL: POSITIVE

## 2023-01-22 NOTE — LETTER
[facility] EMERGENCY DEPARTMENT  [address] [facility] [address]  [phone]  Dept: [phone]      EMTALA TRANSFER CONSENT    NAME Felix Keenan                                        [facility] 1985                              MRN [id number]    I have been informed of my rights regarding examination, treatment, and transfer   by Dr Keisha Savage MD    Benefits:  treatment for depression     Risks:  delay in care       { ED EMTALA TRANSFER CHOICES:[id number]}    I authorize the performance of emergency medical procedures and treatments upon me in both transit and upon arrival at the receiving facility  Additionally, I authorize the release of any and all medical records to the receiving facility and request they be transported with me, if possible  I understand that the safest mode of transportation during a medical emergency is an ambulance and that the Hospital advocates the use of this mode of transport  Risks of traveling to the receiving facility by car, including absence of medical control, life sustaining equipment, such as oxygen, and medical personnel has been explained to me and I fully understand them  (BERNARD CORRECT BOX BELOW)  [x  ]  I consent to the stated transfer and to be transported by ambulance/helicopter  [  ]  I consent to the stated transfer, but refuse transportation by ambulance and accept full responsibility for my transportation by car    I understand the risks of non-ambulance transfers and I exonerate the Hospital and its staff from any deterioration in my condition that results from this refusal     X___________________________________________    DATE  23  TIME________  Signature of patient or legally responsible individual signing on patient behalf           RELATIONSHIP TO PATIENT__self_______________________          Provider Certification    NAME Felix Keenan                                         1985 MRN [id number]    A medical screening exam was performed on the above named patient  Based on the examination:    Condition Necessitating Transfer The encounter diagnosis was Suicidal ideations  Patient Condition:  depression     Reason for Transfer:  mental health treatment     Transfer Requirements: [address]   · Space available and qualified personnel available for treatment as acknowledged by  admissions   · Agreed to accept transfer and to provide appropriate medical treatment as acknowledged by        Dr Laney Juarez  · Appropriate medical records of the examination and treatment of the patient are provided at the time of transfer   [address] [place] [address] [address] ___ap____  · Transfer will be performed by qualified personnel from   [address] [place] [address]  and appropriate transfer equipment as required, including the use of necessary and appropriate life support measures  Provider Certification: I have examined the patient and explained the following risks and benefits of being transferred/refusing transfer to the patient/family:   voluntary mental health treatment      Based on these reasonable risks and benefits to the patient and/or the unborn child(delmi), and based upon the information available at the time of the patient’s examination, I certify that the medical benefits reasonably to be expected from the provision of appropriate medical treatments at another medical facility outweigh the increasing risks, if any, to the individual’s medical condition, and in the case of labor to the unborn child, from effecting the transfer      X____________________________________________ DATE 01/23/23        TIME_______      ORIGINAL - SEND TO MEDICAL RECORDS   COPY - SEND WITH PATIENT DURING TRANSFER

## 2023-01-23 VITALS
DIASTOLIC BLOOD PRESSURE: 78 MMHG | WEIGHT: 220.24 LBS | BODY MASS INDEX: 30.72 KG/M2 | OXYGEN SATURATION: 100 % | TEMPERATURE: 98 F | HEART RATE: 81 BPM | RESPIRATION RATE: 16 BRPM | SYSTOLIC BLOOD PRESSURE: 139 MMHG

## 2023-01-23 NOTE — ED NOTES
[address] [facility]- Full  [facility]- sent clinical  [address] [facility] Lily Ontiveros- sent clinical  [facility]- no beds  [facility]- at [address]- only male [facility] female  [address] [facility] [address]- only female low acuity  [address]- No beds  [place]- [place]- Nothing available

## 2023-01-23 NOTE — ED NOTES
Pt is accepted at [facility] for a discharge bed  Precert to be completed before transport arrival restrictions  Moo Calabrese: [phone]  F: [phone]  NPI: [id number]  Tax ID: [id number]  Dr Mardy Bernheim    Attempted to complete precert with [employer] at [phone]  Offices are closed until 8:30AM  Voicemail left requesting a return call to complete precert

## 2023-01-23 NOTE — ED NOTES
Insurance Authorization for admission:   Phone call placed to [employer]  Phone number: [phone]    Spoke to [facility]  ** days approved  to be given when clinical is reviewed  Level of care: inpatient mental health   Review on **   To be determined  Authorization #  Initial Shu Pleasant is :  [id number]    Clinical reviewer is Valerie Aj   [id number]    Clinical was faxed to [phone]

## 2023-01-23 NOTE — ED PROVIDER NOTES
History  Chief Complaint   Patient presents with   • Psychiatric Evaluation     Pt arrives with [employer] with reports from [employer] that pt was a welfare check due to pt's children's mother reporting that pt told her he was going to kill himself  Police report that a noose was found in his home  Pt states he is suicidal but is unwilling to elaborate at this time  81-year-old male presented emerged department with suicidal ideations  Family concerned because patient making suicidal comments  Also notes was found in his home by police during a wellness check  When asked about this patient says no comment  He would not elaborate about his plan for suicide  He denies chest pain shortness of breath nausea vomiting diarrhea headache vision changes  Denies dysuria materia  Psychiatric Evaluation      Prior to Admission Medications   Prescriptions Last Dose Informant Patient Reported? Taking?   fluticasone (FLONASE) 50 mcg/act nasal spray   No No   Si spray into each nostril daily   polymyxin b-trimethoprim (POLYTRIM) ophthalmic solution   No No   Sig: Administer 1 drop to the right eye every 4 (four) hours   Patient not taking: Reported on 2021   predniSONE 20 mg tablet   No No   Sig: Take 2 tablets (40 mg total) by mouth daily   valACYclovir (VALTREX) 500 mg tablet   No No   Sig: Take 1 tablet (500 mg total) by mouth 2 (two) times a day for 15 days      Facility-Administered Medications: None       History reviewed  No pertinent past medical history  Past Surgical History:   Procedure Laterality Date   • FRACTURE SURGERY Left     tib/fib       History reviewed  No pertinent family history  I have reviewed and agree with the history as documented      E-Cigarette/Vaping   • E-Cigarette Use Never User      E-Cigarette/Vaping Substances     Social History     Tobacco Use   • Smoking status: Every Day     Packs/day: 0 50     Types: Cigarettes   • Smokeless tobacco: Never   Vaping Use   • Vaping Use: Never used   Substance Use Topics   • Alcohol use: Yes     Comment: few times a month   • Drug use: Yes     Types: Marijuana       Review of Systems    Physical Exam  Physical Exam  Vitals and nursing note reviewed  Constitutional:       General: He is not in acute distress  Appearance: He is well-developed  HENT:      Head: Normocephalic and atraumatic  Nose: Nose normal       Mouth/Throat:      Mouth: Mucous membranes are moist    Eyes:      Conjunctiva/sclera: Conjunctivae normal    Cardiovascular:      Rate and Rhythm: Normal rate and regular rhythm  Heart sounds: No murmur heard  Pulmonary:      Effort: Pulmonary effort is normal  No respiratory distress  Breath sounds: Normal breath sounds  Abdominal:      Palpations: Abdomen is soft  Tenderness: There is no abdominal tenderness  Musculoskeletal:         General: No swelling  Cervical back: Neck supple  Skin:     General: Skin is warm and dry  Capillary Refill: Capillary refill takes less than 2 seconds  Neurological:      Mental Status: He is alert and oriented to person, place, and time           Vital Signs  ED Triage Vitals [01/22/23 1950]   Temp Pulse Respirations Blood Pressure SpO2   -- (!) 115 18 156/93 100 %      Temp src Heart Rate Source Patient Position - Orthostatic VS BP Location FiO2 (%)   -- Monitor Sitting Left arm --      Pain Score       --           Vitals:    01/22/23 1950   BP: 156/93   Pulse: (!) 115   Patient Position - Orthostatic VS: Sitting         Visual Acuity      ED Medications  Medications - No data to display    Diagnostic Studies  Results Reviewed     Procedure Component Value Units Date/Time    FLU/RSV/COVID - if FLU/RSV clinically relevant [[id number]]  (Normal) Collected: 01/22/23 2012    Lab Status: Final result Specimen: Nares from Nose Updated: 01/22/23 2101     SARS-CoV-2 Negative     INFLUENZA A PCR Negative     INFLUENZA B PCR Negative     RSV PCR Negative    Narrative:      FOR PEDIATRIC PATIENTS - copy/paste COVID Guidelines URL to browser: [email or web address]    SARS-CoV-2 assay is a Nucleic Acid Amplification assay intended for the  qualitative detection of nucleic acid from SARS-CoV-2 in nasopharyngeal  swabs  Results are for the presumptive identification of SARS-CoV-2 RNA  Positive results are indicative of infection with SARS-CoV-2, the virus  causing COVID-19, but do not rule out bacterial infection or co-infection  with other viruses  Laboratories within the [place] and its  territories are required to report all positive results to the appropriate  public health authorities  Negative results do not preclude SARS-CoV-2  infection and should not be used as the sole basis for treatment or other  patient management decisions  Negative results must be combined with  clinical observations, patient history, and epidemiological information  This test has not been FDA cleared or approved  This test has been authorized by FDA under an Emergency Use Authorization  (EUA)  This test is only authorized for the duration of time the  declaration that circumstances exist justifying the authorization of the  emergency use of an in vitro diagnostic tests for detection of SARS-CoV-2  virus and/or diagnosis of COVID-19 infection under section 564(b)(1) of  the Act, 21 U  S C  896RPB-3(Z)(4), unless the authorization is terminated  or revoked sooner  The test has been validated but independent review by FDA  and CLIA is pending  Test performed using [facility] GeneXpert: This RT-PCR assay targets N2,  a region unique to SARS-CoV-2  A conserved region in the E-gene was chosen  for pan-Sarbecovirus detection which includes SARS-CoV-2  According to CMS-2020-01-R, this platform meets the definition of high-throughput technology      Rapid drug screen, urine [[id number]]  (Abnormal) Collected: 01/22/23 2012    Lab Status: Final result Specimen: Urine, Clean Catch Updated: 01/22/23 2040     Amph/Meth UR Negative     Barbiturate Ur Negative     Benzodiazepine Urine Negative     Cocaine Urine Negative     Methadone Urine Negative     Opiate Urine Negative     PCP Ur Negative     THC Urine Positive     Oxycodone Urine Negative    Narrative:      Presumptive report  If requested, specimen will be sent to reference lab for confirmation  FOR MEDICAL PURPOSES ONLY  IF CONFIRMATION NEEDED PLEASE CONTACT THE LAB WITHIN 5 DAYS  Drug Screen Cutoff Levels:  AMPHETAMINE/METHAMPHETAMINES  1000 ng/mL  BARBITURATES     200 ng/mL  BENZODIAZEPINES     200 ng/mL  COCAINE      300 ng/mL  METHADONE      300 ng/mL  OPIATES      300 ng/mL  PHENCYCLIDINE     25 ng/mL  THC       50 ng/mL  OXYCODONE      100 ng/mL    POCT alcohol breath test [[id number]]  (Normal) Resulted: 01/22/23 2012    Lab Status: Final result Updated: 01/22/23 2012     EXTBreath Alcohol 0 124                 No orders to display              Procedures  Procedures         ED Course                               SBIRT 22yo+    Flowsheet Row Most Recent Value   SBIRT (25 yo +)    In order to provide better care to our patients, we are screening all of our patients for alcohol and drug use  Would it be okay to ask you these screening questions? Yes Filed at: 01/22/2023 2037   Initial Alcohol Screen: US AUDIT-C     1  How often do you have a drink containing alcohol? 1 Filed at: 01/22/2023 2037   2  How many drinks containing alcohol do you have on a typical day you are drinking? 0 Filed at: 01/22/2023 2037   3a  Male UNDER 65: How often do you have five or more drinks on one occasion? 0 Filed at: 01/22/2023 2037   Audit-C Score 1 Filed at: 01/22/2023 2037   ERICKA: How many times in the past year have you    Used an illegal drug or used a prescription medication for non-medical reasons?  Never Filed at: 01/22/2023 2037                    Medical Decision Making  28-year-old male presenting emerged department with suicidal ideation  Patient signed 12, placed on one-to-one observation as he is not forthcoming about his suicide plan  Suicidal ideations: complicated acute illness or injury  Amount and/or Complexity of Data Reviewed  Labs: ordered  Risk  Decision regarding hospitalization  Disposition  Final diagnoses:   Suicidal ideations     Time reflects when diagnosis was documented in both MDM as applicable and the Disposition within this note     Time User Action Codes Description Comment    1/22/2023  9:01 PM Yein Gutiérrez Add [V23 501] Suicidal ideations       ED Disposition     ED Disposition   Transfer to [address] [place] [address]   --    Date/Time   Sun Jan 22, 2023  9:01 PM    Comment   Joe Martinez should be transferred out to D and has been medically cleared  MD Documentation    Trina Cooper Most Recent Value   Sending MD Dr Ingrid Eaton    None         Patient's Medications   Discharge Prescriptions    No medications on file       No discharge procedures on file      PDMP Review     None          ED Provider  Electronically Signed by           Coretta Meraz MD  01/22/23 3887

## 2023-01-23 NOTE — ED NOTES
Patient presented to ED with [employer]- [employer] officers state that they received a phone call from the mother of pts children reporting that pt sent a text saying "tell the kids I loved them"- police report that they went to the home and found a rope tied around his bannister where it was a steep drop  Concluded that he planned to kill himself tonight  Patient initially was not willing to sign in- then eventually agreed to sign a 201 for inpatient mh tx  Patient refuses to speak with anyone  Family arrived for him in the waiting room- understand that he does not want to speak to anyone- pts aunt left her phone number for any collateral information needed- [facility] [phone]

## 2023-01-23 NOTE — ED NOTES
Patient is accepted at [facility]  Patient is accepted by [facility]    Transportation is arranged with [facility]  Transportation is scheduled for 1200    EMTALA completed    [facility] aware of transfer time  Patient is aware

## 2023-02-17 ENCOUNTER — OFFICE VISIT (OUTPATIENT)
Dept: FAMILY MEDICINE CLINIC | Facility: CLINIC | Age: 38
End: 2023-02-17

## 2023-02-17 VITALS
HEART RATE: 91 BPM | BODY MASS INDEX: 32.62 KG/M2 | TEMPERATURE: 97.6 F | OXYGEN SATURATION: 97 % | HEIGHT: 71 IN | WEIGHT: 233 LBS | SYSTOLIC BLOOD PRESSURE: 146 MMHG | DIASTOLIC BLOOD PRESSURE: 82 MMHG

## 2023-02-17 DIAGNOSIS — F32.5 MAJOR DEPRESSIVE DISORDER WITH SINGLE EPISODE, IN REMISSION (HCC): ICD-10-CM

## 2023-02-17 DIAGNOSIS — J35.01 CHRONIC TONSILLITIS: Primary | ICD-10-CM

## 2023-02-17 PROBLEM — F10.11 HISTORY OF ALCOHOL ABUSE: Status: ACTIVE | Noted: 2023-02-17

## 2023-02-17 RX ORDER — ARIPIPRAZOLE 20 MG/1
20 TABLET ORAL
COMMUNITY
Start: 2023-01-30

## 2023-02-17 RX ORDER — HYDROXYZINE PAMOATE 50 MG/1
50 CAPSULE ORAL
COMMUNITY
Start: 2023-02-08 | End: 2023-02-17 | Stop reason: ALTCHOICE

## 2023-02-17 NOTE — PROGRESS NOTES
Assessment/Plan:         Problem List Items Addressed This Visit        Other    Major depressive disorder with single episode, in remission ([address] )     Possible in a partial remission   Has appointment with psychiatrist         Relevant Medications    ARIPiprazole (ABILIFY) 20 MG tablet   Other Visit Diagnoses     Chronic tonsillitis    -  Primary    Relevant Orders    Ambulatory Referral to Otolaryngology            Subjective:      Patient ID: Elisabeth Issa is a 40 y o  male  Patient here for follow-up  Patient was admitted to [facility] behavioral health from January 23, 2023 and discharged on January 29, 2023 for major depressive disorder with suicidal ideation  Currently feeling much better going through [facility] and he has an appointment with Dr Kyle Owen psychiatrist in [facility] in near future patient feeling much better normal suicidal ideation does not feel depressed at present time denies any other complaint other than patient have chronic tonsils issue patient states that every couple weeks or every month he is in the urgent care for tonsil infections and frequently prescribed antibiotic he would rather have tonsils out no fever or chills  No chest pain or shortness of breath  No headache  No tingling numbness or weakness  No lightheadedness or dizziness  No bowel or bladder problem  The following portions of the patient's history were reviewed and updated as appropriate:   Past Medical History:  He has a past medical history of Asthma and Genital herpes  ,  _______________________________________________________________________  Medical Problems:  does not have any pertinent problems on file ,  _______________________________________________________________________  Past Surgical History:   has a past surgical history that includes Fracture surgery (Left)  ,  _______________________________________________________________________  Family History:  family history includes Cancer in his father and paternal grandfather; Diabetes in his paternal grandfather; Emphysema in his maternal grandfather ,  _______________________________________________________________________  Social History:   reports that he has quit smoking  His smoking use included cigarettes  He has never used smokeless tobacco  He reports current alcohol use  He reports current drug use  Drug: Marijuana  ,  _______________________________________________________________________  Allergies:  has No Known Allergies     _______________________________________________________________________  Current Outpatient Medications   Medication Sig Dispense Refill   • ARIPiprazole (ABILIFY) 20 MG tablet Take 20 mg by mouth daily at bedtime     • fluticasone (FLONASE) 50 mcg/act nasal spray 1 spray into each nostril daily 9 9 mL 0   • valACYclovir (VALTREX) 500 mg tablet Take 1 tablet (500 mg total) by mouth 2 (two) times a day for 15 days 30 tablet 0     No current facility-administered medications for this visit      _______________________________________________________________________  Review of Systems   Constitutional: Negative for chills, fatigue and fever  HENT: Negative for congestion, ear pain, rhinorrhea, sneezing and sore throat  Eyes: Negative for redness, itching and visual disturbance  Respiratory: Negative for cough, chest tightness and shortness of breath  Cardiovascular: Negative for chest pain, palpitations and leg swelling  Gastrointestinal: Negative for abdominal pain, blood in stool, diarrhea, nausea and vomiting  Endocrine: Negative for cold intolerance and heat intolerance  Genitourinary: Negative for dysuria, frequency and urgency  Musculoskeletal: Negative for arthralgias, back pain and myalgias  Skin: Negative for color change and rash  Neurological: Negative for dizziness, weakness, light-headedness, numbness and headaches  Hematological: Does not bruise/bleed easily     Psychiatric/Behavioral: Negative for agitation, behavioral problems and confusion  Objective:  Vitals:    02/17/23 0837   BP: 146/82   BP Location: Right arm   Patient Position: Sitting   Cuff Size: Adult   Pulse: 91   Temp: 97 6 °F (36 4 °C)   TempSrc: Tympanic   SpO2: 97%   Weight: 106 kg (233 lb)   Height: 5' 11" (1 803 m)     Body mass index is 32 5 kg/m²  Physical Exam  Vitals and nursing note reviewed  Constitutional:       General: He is not in acute distress  Appearance: Normal appearance  He is not ill-appearing, toxic-appearing or diaphoretic  HENT:      Head: Normocephalic and atraumatic  Right Ear: Tympanic membrane and ear canal normal       Left Ear: Tympanic membrane and ear canal normal       Nose: Nose normal  No congestion  Mouth/Throat:      Mouth: Mucous membranes are moist    Eyes:      General: No scleral icterus  Right eye: No discharge  Left eye: No discharge  Extraocular Movements: Extraocular movements intact  Conjunctiva/sclera: Conjunctivae normal       Pupils: Pupils are equal, round, and reactive to light  Cardiovascular:      Rate and Rhythm: Normal rate and regular rhythm  Pulses: Normal pulses  Heart sounds: Normal heart sounds  No murmur heard  No gallop  Pulmonary:      Effort: Pulmonary effort is normal  No respiratory distress  Breath sounds: Normal breath sounds  No wheezing, rhonchi or rales  Abdominal:      General: Abdomen is flat  Bowel sounds are normal  There is no distension  Palpations: Abdomen is soft  Tenderness: There is no abdominal tenderness  There is no right CVA tenderness, left CVA tenderness or guarding  Musculoskeletal:         General: No swelling or tenderness  Normal range of motion  Cervical back: Normal range of motion and neck supple  No rigidity  Right lower leg: No edema  Left lower leg: No edema  Lymphadenopathy:      Cervical: No cervical adenopathy     Skin:     General: Skin is warm       Capillary Refill: Capillary refill takes 2 to 3 seconds  Coloration: Skin is not jaundiced  Findings: No bruising or rash  Neurological:      General: No focal deficit present  Mental Status: He is alert and oriented to person, place, and time  Mental status is at baseline  Motor: No weakness        Gait: Gait normal    Psychiatric:         Mood and Affect: Mood normal          Behavior: Behavior normal

## 2023-03-27 ENCOUNTER — OFFICE VISIT (OUTPATIENT)
Dept: FAMILY MEDICINE CLINIC | Facility: CLINIC | Age: 38
End: 2023-03-27

## 2023-03-27 VITALS
OXYGEN SATURATION: 97 % | SYSTOLIC BLOOD PRESSURE: 130 MMHG | DIASTOLIC BLOOD PRESSURE: 84 MMHG | HEIGHT: 71 IN | BODY MASS INDEX: 32.76 KG/M2 | TEMPERATURE: 98.7 F | HEART RATE: 83 BPM | WEIGHT: 234 LBS

## 2023-03-27 DIAGNOSIS — Z11.4 SCREENING FOR HIV (HUMAN IMMUNODEFICIENCY VIRUS): ICD-10-CM

## 2023-03-27 DIAGNOSIS — Z00.00 ANNUAL PHYSICAL EXAM: ICD-10-CM

## 2023-03-27 DIAGNOSIS — A60.00 HERPES SIMPLEX INFECTION OF GENITOURINARY SYSTEM: Primary | ICD-10-CM

## 2023-03-27 DIAGNOSIS — E78.2 MIXED HYPERLIPIDEMIA: ICD-10-CM

## 2023-03-27 DIAGNOSIS — Z11.59 NEED FOR HEPATITIS C SCREENING TEST: ICD-10-CM

## 2023-03-27 RX ORDER — VALACYCLOVIR HYDROCHLORIDE 500 MG/1
500 TABLET, FILM COATED ORAL 2 TIMES DAILY
Qty: 6 TABLET | Refills: 0 | Status: SHIPPED | OUTPATIENT
Start: 2023-03-27 | End: 2023-03-30

## 2023-03-27 NOTE — ASSESSMENT & PLAN NOTE
First started 5-6 years ago  Requesting testing but explained that a lesion swab would really be the way to confirm though we can do antibody check though not ideal test- pt understood and wants test anyway

## 2023-03-27 NOTE — PROGRESS NOTES
Assessment/Plan:       Problem List Items Addressed This Visit        Genitourinary    Herpes simplex infection of genitourinary system - Primary     First started 5-6 years ago  Requesting testing but explained that a lesion swab would really be the way to confirm though we can do antibody check though not ideal test- pt understood and wants test anyway         Relevant Medications    valACYclovir (VALTREX) 500 mg tablet    Other Relevant Orders    HSV TYPE 1,2 DNA PCR       Other    Mixed hyperlipidemia     Check lipids         Relevant Orders    Comprehensive metabolic panel    Lipid panel   Other Visit Diagnoses     Annual physical exam        Relevant Orders    CBC and differential    RPR-Syphilis Screening (Total Syphilis IGG/IGM)    Chlamydia/GC amplified DNA by PCR    Need for hepatitis C screening test        Relevant Orders    Hepatitis C Antibody ([facility])    Screening for HIV (human immunodeficiency virus)        Relevant Orders    HIV 1/2 AG/AB w Reflex [facility] for 2 yr old and above            Subjective:     Chief Complaint   Patient presents with   • Follow-up     6 week follow up             Patient ID: Jeannine Grove is a 40 y o  male who is here for Valtrex  States he has a HSV outbreak and wanted treatment  First started 5-6 years ago and gets periodic outbreaks  Also wants other STD testing  Denies any other issues today  Continues to follow with psych  Symptoms are stable  Tolerating Abilify without issues  Patient's past medical history, surgical history, family history, medications, allergies and social history reviewed and updated    Review of Systems   Constitutional: Negative for chills and fever  HENT: Negative for congestion and sore throat  Respiratory: Negative for cough and shortness of breath  Cardiovascular: Negative for chest pain and leg swelling  Gastrointestinal: Negative for abdominal pain, blood in stool and diarrhea     Genitourinary: Positive for genital sores  Negative for dysuria, frequency, penile discharge, penile pain and urgency  Neurological: Negative for headaches  All other ROS negative  Objective:    Vitals:    03/27/23 1012   BP: 130/84   Pulse: 83   Temp: 98 7 °F (37 1 °C)   SpO2: 97%          Physical Exam  Vitals reviewed  Constitutional:       General: He is not in acute distress  HENT:      Right Ear: External ear normal       Left Ear: External ear normal       Nose: Nose normal       Mouth/Throat:      Mouth: Mucous membranes are moist    Eyes:      Conjunctiva/sclera: Conjunctivae normal    Cardiovascular:      Rate and Rhythm: Normal rate and regular rhythm  Heart sounds: No murmur heard  Pulmonary:      Effort: Pulmonary effort is normal  No respiratory distress  Breath sounds: No wheezing  Abdominal:      General: There is no distension  Palpations: Abdomen is soft  Tenderness: There is no abdominal tenderness  Musculoskeletal:      Right lower leg: No edema  Left lower leg: No edema  Lymphadenopathy:      Cervical: No cervical adenopathy  Neurological:      Mental Status: He is alert and oriented to person, place, and time     Psychiatric:         Mood and Affect: Mood normal              Pamella Hurt MD  Internal Medicine and Pediatrics

## 2023-03-30 ENCOUNTER — APPOINTMENT (OUTPATIENT)
Dept: LAB | Facility: HOSPITAL | Age: 38
End: 2023-03-30

## 2023-03-30 DIAGNOSIS — E78.2 MIXED HYPERLIPIDEMIA: ICD-10-CM

## 2023-03-30 DIAGNOSIS — A60.00 HERPES SIMPLEX INFECTION OF GENITOURINARY SYSTEM: ICD-10-CM

## 2023-03-30 DIAGNOSIS — Z11.59 NEED FOR HEPATITIS C SCREENING TEST: ICD-10-CM

## 2023-03-30 DIAGNOSIS — Z11.4 SCREENING FOR HIV (HUMAN IMMUNODEFICIENCY VIRUS): ICD-10-CM

## 2023-03-30 DIAGNOSIS — Z00.00 ANNUAL PHYSICAL EXAM: ICD-10-CM

## 2023-03-30 LAB
ALBUMIN SERPL BCP-MCNC: 4.4 G/DL (ref 3.5–5)
ALP SERPL-CCNC: 52 U/L (ref 34–104)
ALT SERPL W P-5'-P-CCNC: 19 U/L (ref 7–52)
ANION GAP SERPL CALCULATED.3IONS-SCNC: 7 MMOL/L (ref 4–13)
AST SERPL W P-5'-P-CCNC: 15 U/L (ref 13–39)
BASOPHILS # BLD AUTO: 0.03 THOUSANDS/ÂΜL (ref 0–0.1)
BASOPHILS NFR BLD AUTO: 0 % (ref 0–1)
BILIRUB SERPL-MCNC: 0.36 MG/DL (ref 0.2–1)
BUN SERPL-MCNC: 17 MG/DL (ref 5–25)
CALCIUM SERPL-MCNC: 9.3 MG/DL (ref 8.4–10.2)
CHLORIDE SERPL-SCNC: 106 MMOL/L (ref 96–108)
CHOLEST SERPL-MCNC: 132 MG/DL
CO2 SERPL-SCNC: 27 MMOL/L (ref 21–32)
CREAT SERPL-MCNC: 1.03 MG/DL (ref 0.6–1.3)
EOSINOPHIL # BLD AUTO: 0.19 THOUSAND/ÂΜL (ref 0–0.61)
EOSINOPHIL NFR BLD AUTO: 2 % (ref 0–6)
ERYTHROCYTE [DISTWIDTH] IN BLOOD BY AUTOMATED COUNT: 13.2 % (ref 11.6–15.1)
GFR SERPL CREATININE-BSD FRML MDRD: 93 ML/MIN/1.73SQ M
GLUCOSE P FAST SERPL-MCNC: 105 MG/DL (ref 65–99)
HCT VFR BLD AUTO: 45 % (ref 36.5–49.3)
HCV AB SER QL: NORMAL
HDLC SERPL-MCNC: 30 MG/DL
HGB BLD-MCNC: 14.4 G/DL (ref 12–17)
HIV 1+2 AB+HIV1 P24 AG SERPL QL IA: NORMAL
HIV 2 AB SERPL QL IA: NORMAL
HIV1 AB SERPL QL IA: NORMAL
HIV1 P24 AG SERPL QL IA: NORMAL
IMM GRANULOCYTES # BLD AUTO: 0.07 THOUSAND/UL (ref 0–0.2)
IMM GRANULOCYTES NFR BLD AUTO: 1 % (ref 0–2)
LDLC SERPL CALC-MCNC: 69 MG/DL (ref 0–100)
LYMPHOCYTES # BLD AUTO: 2.02 THOUSANDS/ÂΜL (ref 0.6–4.47)
LYMPHOCYTES NFR BLD AUTO: 23 % (ref 14–44)
MCH RBC QN AUTO: 30.8 PG (ref 26.8–34.3)
MCHC RBC AUTO-ENTMCNC: 32 G/DL (ref 31.4–37.4)
MCV RBC AUTO: 96 FL (ref 82–98)
MONOCYTES # BLD AUTO: 1.05 THOUSAND/ÂΜL (ref 0.17–1.22)
MONOCYTES NFR BLD AUTO: 12 % (ref 4–12)
NEUTROPHILS # BLD AUTO: 5.29 THOUSANDS/ÂΜL (ref 1.85–7.62)
NEUTS SEG NFR BLD AUTO: 62 % (ref 43–75)
NONHDLC SERPL-MCNC: 102 MG/DL
NRBC BLD AUTO-RTO: 0 /100 WBCS
PLATELET # BLD AUTO: 243 THOUSANDS/UL (ref 149–390)
PMV BLD AUTO: 10.7 FL (ref 8.9–12.7)
POTASSIUM SERPL-SCNC: 4.3 MMOL/L (ref 3.5–5.3)
PROT SERPL-MCNC: 6.9 G/DL (ref 6.4–8.4)
RBC # BLD AUTO: 4.67 MILLION/UL (ref 3.88–5.62)
SODIUM SERPL-SCNC: 140 MMOL/L (ref 135–147)
TREPONEMA PALLIDUM IGG+IGM AB [PRESENCE] IN SERUM OR PLASMA BY IMMUNOASSAY: NORMAL
TRIGL SERPL-MCNC: 163 MG/DL
WBC # BLD AUTO: 8.65 THOUSAND/UL (ref 4.31–10.16)

## 2023-03-31 LAB
C TRACH DNA SPEC QL NAA+PROBE: NEGATIVE
N GONORRHOEA DNA SPEC QL NAA+PROBE: NEGATIVE

## 2023-04-03 LAB
HSV1 DNA SPEC QL NAA+PROBE: NEGATIVE
HSV2 DNA SPEC QL NAA+PROBE: NEGATIVE

## 2023-10-10 ENCOUNTER — OFFICE VISIT (OUTPATIENT)
Dept: FAMILY MEDICINE CLINIC | Facility: CLINIC | Age: 38
End: 2023-10-10
Payer: COMMERCIAL

## 2023-10-10 VITALS
TEMPERATURE: 97.8 F | DIASTOLIC BLOOD PRESSURE: 80 MMHG | HEIGHT: 71 IN | WEIGHT: 224 LBS | HEART RATE: 75 BPM | BODY MASS INDEX: 31.36 KG/M2 | SYSTOLIC BLOOD PRESSURE: 122 MMHG | OXYGEN SATURATION: 99 %

## 2023-10-10 DIAGNOSIS — Z00.00 ANNUAL PHYSICAL EXAM: Primary | ICD-10-CM

## 2023-10-10 DIAGNOSIS — R11.0 NAUSEA: ICD-10-CM

## 2023-10-10 DIAGNOSIS — E78.2 MIXED HYPERLIPIDEMIA: ICD-10-CM

## 2023-10-10 DIAGNOSIS — R73.9 HYPERGLYCEMIA: ICD-10-CM

## 2023-10-10 DIAGNOSIS — R39.9 GENITOURINARY COMPLAINTS: ICD-10-CM

## 2023-10-10 DIAGNOSIS — R41.3 MEMORY CHANGE: ICD-10-CM

## 2023-10-10 DIAGNOSIS — F32.5 MAJOR DEPRESSIVE DISORDER WITH SINGLE EPISODE, IN REMISSION (HCC): ICD-10-CM

## 2023-10-10 PROCEDURE — 99395 PREV VISIT EST AGE 18-39: CPT | Performed by: INTERNAL MEDICINE

## 2023-10-10 NOTE — ASSESSMENT & PLAN NOTE
Stable  Encouraged to follow up with his psychiatrist to discuss medication- pt expressed understanding

## 2023-10-10 NOTE — ASSESSMENT & PLAN NOTE
Stable  Likely related to depressed mood but we can check Vitamin d, TSH and B12 in addition to his regular labs

## 2023-10-10 NOTE — ASSESSMENT & PLAN NOTE
No red flags at this time  Suggested trial of Pepcid or Prilosec daily x 2 weeks- if better can use prn  If not improving, he will send a message

## 2023-10-10 NOTE — PROGRESS NOTES
ADULT ANNUAL [address] [facility] PRIMARY CARE    NAME: James Hurst  AGE: 41 y.o. SEX: male  : 1985     DATE: 10/10/2023     Assessment and Plan:     Problem List Items Addressed This Visit        Other    Major depressive disorder with single episode, in remission ([address])     Stable  Encouraged to follow up with his psychiatrist to discuss medication- pt expressed understanding         Mixed hyperlipidemia     Diet controlled  Recheck lipid panel prior to next visit         Relevant Orders    Lipid panel    Annual physical exam - Primary     Annual physical exam completed today. Discussed health maintenance topics including immunizations. Risk and benefits of relevant cancer screenings discussed and offered. Encouraged cessation of smoking if applicable. Encouraged healthy diet and exercise 3-5 times per week as tolerated. Reviewed prior labs and ordered labs if due. Repeat annual physical in 1 year.             Relevant Orders    CBC and differential    Comprehensive metabolic panel    Memory change     Stable  Likely related to depressed mood but we can check Vitamin d, TSH and B12 in addition to his regular labs         Relevant Orders    TSH, 3rd generation with Free T4 reflex    Vitamin B12    Vitamin D 25 hydroxy    Nausea     No red flags at this time  Suggested trial of Pepcid or Prilosec daily x 2 weeks- if better can use prn  If not improving, he will send a message         Genitourinary complaints     Discussed his partner's symptoms at length  I explained that there is nothing we do to test his "pH"  He denies any  symptoms  Suggested STD testing but pt declines since he was tested in March and negative and he states his Gf was negative recently reportedly         Other Visit Diagnoses     Hyperglycemia        Relevant Orders    HEMOGLOBIN A1C W/ EAG ESTIMATION          Immunizations and preventive care screenings were discussed with patient today. Appropriate education was printed on patient's after visit summary. Counseling:  Alcohol/drug use: discussed moderation in alcohol intake, the recommendations for healthy alcohol use, and avoidance of illicit drug use. Dental Health: discussed importance of regular tooth brushing, flossing, and dental visits. Injury prevention: discussed safety/seat belts, safety helmets, smoke detectors, carbon dioxide detectors, and smoking near bedding or upholstery. Sexual health: discussed sexually transmitted diseases, partner selection, use of condoms, avoidance of unintended pregnancy, and contraceptive alternatives. · Exercise: the importance of regular exercise/physical activity was discussed. Recommend exercise 3-5 times per week for at least 30 minutes. BMI Counseling: Body mass index is 31.24 kg/m². The BMI is above normal. Nutrition recommendations include encouraging healthy choices of fruits and vegetables, decreasing fast food intake, consuming healthier snacks, moderation in carbohydrate intake, increasing intake of lean protein and reducing intake of cholesterol. Exercise recommendations include exercising 3-5 times per week and strength training exercises. No pharmacotherapy was ordered. Rationale for BMI follow-up plan is due to patient being overweight or obese. Depression Screening and Follow-up Plan: Continue regular follow-up with their mental health provider who is managing their mental health condition(s). Return in about 1 year (around 10/10/2024) for Annual physical.     Chief Complaint:     Chief Complaint   Patient presents with   • swollen tonsils    • Nausea      History of Present Illness:     Adult Annual Physical   Patient here for a comprehensive physical exam. The patient reports problems - he has been having intermittent nausea that lasts 15-20 mins almost every day. Denies any vomiting, weight loss, fever, night sweats. Not necessarily associated with food.  He works nights and tries to eat throughout the night. Denies any excessive buriping, no epigastric pain. Bowel movements have been normal.      He also reports some depression. He has not seen is psychiatrist in some time but has to follow up. He is not on medication. Denies thoughts of hurting himself. Used to be on Abilify. He notes that he has been more tired than usual and seems to be forgetting things. He is able to do long-term and short-term recall just will forget certain things. Unable to give further examples. He also is wondering about getting his "PH" tested. States that his gf of almost 10 months has been having some  issues and was told by her doctor that he should be tested. He states that she was tested for everything recently and was negative. She has not been on any medication lately. He denies any urinary issue or discharge. He has only had 1 sexual partner since January. Diet and Physical Activity  · Diet/Nutrition: admits that diet is not great. · Exercise: no formal exercise- but walks several miles at work daily     Depression Screening  PHQ-2/9 Depression Screening    Little interest or pleasure in doing things: 2 - more than half the days  Feeling down, depressed, or hopeless: 1 - several days  Trouble falling or staying asleep, or sleeping too much: 0 - not at all  Feeling tired or having little energy: 2 - more than half the days  Poor appetite or overeatin - not at all  Feeling bad about yourself - or that you are a failure or have let yourself or your family down: 0 - not at all  Trouble concentrating on things, such as reading the newspaper or watching television: 2 - more than half the days  Moving or speaking so slowly that other people could have noticed.  Or the opposite - being so fidgety or restless that you have been moving around a lot more than usual: 0 - not at all  Thoughts that you would be better off dead, or of hurting yourself in some way: 0 - not at all  PHQ-9 Score: 7   PHQ-9 Interpretation: Mild depression        General Health  · Sleep: sleeps well. · Hearing: No issues. · Vision: most recent eye exam >1 year ago. · Dental: no dental visits for >1 year. Advanced Care Planning  · Do you have an advanced directive? no  · Do you have a durable medical power of ? no     Review of Systems:     Review of Systems   Constitutional: Negative for chills and fever. HENT: Positive for sore throat ( slight for the last few days). Negative for congestion. Respiratory: Negative for cough and shortness of breath. Cardiovascular: Negative for chest pain and leg swelling. Gastrointestinal: Positive for nausea. Negative for abdominal pain, anal bleeding, blood in stool, constipation, diarrhea and vomiting. Genitourinary: Negative for dysuria, frequency, penile discharge, penile pain and penile swelling. Musculoskeletal: Negative. Neurological: Negative for headaches. Psychiatric/Behavioral: Positive for dysphoric mood. Negative for sleep disturbance and suicidal ideas. Past Medical History:     Past Medical History:   Diagnosis Date   • Asthma    • Genital herpes       Past Surgical History:     Past Surgical History:   Procedure Laterality Date   • FRACTURE SURGERY Left     tib/fib      Social History:     Social History     Socioeconomic History   • Marital status: Single     Spouse name: None   • Number of children: None   • Years of education: None   • Highest education level: None   Occupational History   • None   Tobacco Use   • Smoking status: Former     Packs/day: 0.00     Years: 20.00     Total pack years: 0.00     Types: Cigarettes   • Smokeless tobacco: Never   Vaping Use   • Vaping Use: Never used   Substance and Sexual Activity   • Alcohol use: Yes     Comment: few times a month   • Drug use: Yes     Types: Marijuana   • Sexual activity: Yes     Partners: Female     Birth control/protection: I.U.D.    Other Topics Concern   • None   Social History Narrative   • None     Social Determinants of Health     Financial Resource Strain: Not on file   Food Insecurity: Not on file   Transportation Needs: Not on file   Physical Activity: Not on file   Stress: Not on file   Social Connections: Not on file   Intimate Partner Violence: Not on file   Housing Stability: Not on file      Family History:     Family History   Problem Relation Age of Onset   • Cancer Father         pancreas   • Emphysema Maternal Grandfather    • Cancer Paternal Grandfather         prostate   • Diabetes Paternal Grandfather       Current Medications:     No current outpatient medications on file. No current facility-administered medications for this visit. Allergies:     No Known Allergies   Physical Exam:     /80 (BP Location: Left arm, Patient Position: Sitting, Cuff Size: Adult)   Pulse 75   Temp 97.8 °F (36.6 °C) (Tympanic)   Ht 5' 11" (1.803 m)   Wt 102 kg (224 lb)   SpO2 99%   BMI 31.24 kg/m²     Physical Exam  Vitals reviewed. Constitutional:       General: He is not in acute distress. Appearance: He is well-developed. HENT:      Head: Normocephalic and atraumatic. Right Ear: Tympanic membrane normal.      Left Ear: Tympanic membrane normal.      Nose: Nose normal.      Mouth/Throat:      Mouth: Mucous membranes are moist.   Eyes:      Extraocular Movements: Extraocular movements intact. Conjunctiva/sclera: Conjunctivae normal.      Pupils: Pupils are equal, round, and reactive to light. Cardiovascular:      Rate and Rhythm: Normal rate and regular rhythm. Heart sounds: No murmur heard. Pulmonary:      Effort: Pulmonary effort is normal. No respiratory distress. Breath sounds: Normal breath sounds. Abdominal:      Palpations: Abdomen is soft. Tenderness: There is no abdominal tenderness. Musculoskeletal:         General: No swelling. Cervical back: Neck supple.    Lymphadenopathy: Cervical: No cervical adenopathy. Skin:     General: Skin is warm and dry. Capillary Refill: Capillary refill takes less than 2 seconds. Neurological:      Mental Status: He is alert and oriented to person, place, and time. Cranial Nerves: No cranial nerve deficit.       Coordination: Coordination normal.   Psychiatric:         Mood and Affect: Mood normal.         Behavior: Behavior normal.          Tori Gilliam MD   [address]

## 2023-10-10 NOTE — ASSESSMENT & PLAN NOTE
Discussed his partner's symptoms at length  I explained that there is nothing we do to test his "pH"  He denies any  symptoms  Suggested STD testing but pt declines since he was tested in March and negative and he states his Gf was negative recently reportedly

## 2023-11-16 ENCOUNTER — TELEPHONE (OUTPATIENT)
Age: 38
End: 2023-11-16

## 2023-11-16 DIAGNOSIS — A60.00 HERPES SIMPLEX INFECTION OF GENITOURINARY SYSTEM: Primary | ICD-10-CM

## 2023-11-16 NOTE — TELEPHONE ENCOUNTER
Call patient if this will not be able to get filled. States that he needs it. Not on active med list to que up. Reason for call:   [x] Refill   [] Prior Auth  [] Other:     Office:   [x] PCP/Provider -   [] Specialty/Provider -     Medication: Valtrex    Dose/Frequency: 500 mg     Quantity: #6    Pharmacy: [facility]     Does the patient have enough for 3 days?    [] Yes   [x] No - Send as HP to POD

## 2023-11-17 ENCOUNTER — TELEPHONE (OUTPATIENT)
Dept: FAMILY MEDICINE CLINIC | Facility: CLINIC | Age: 38
End: 2023-11-17

## 2023-11-17 RX ORDER — VALACYCLOVIR HYDROCHLORIDE 500 MG/1
500 TABLET, FILM COATED ORAL 2 TIMES DAILY
Qty: 6 TABLET | Refills: 0 | Status: SHIPPED | OUTPATIENT
Start: 2023-11-17 | End: 2023-11-20 | Stop reason: SDUPTHER

## 2023-11-20 DIAGNOSIS — A60.00 HERPES SIMPLEX INFECTION OF GENITOURINARY SYSTEM: ICD-10-CM

## 2023-11-20 RX ORDER — VALACYCLOVIR HYDROCHLORIDE 500 MG/1
500 TABLET, FILM COATED ORAL 2 TIMES DAILY
Qty: 6 TABLET | Refills: 0 | Status: SHIPPED | OUTPATIENT
Start: 2023-11-20 | End: 2023-12-01 | Stop reason: SDUPTHER

## 2023-11-20 NOTE — TELEPHONE ENCOUNTER
Medication Refill Request     Name valtrex     Verified pharmacy   [x]  Verified ordering Provider   [x]  Does patient have enough for the next 3 days?  Yes [] No [x]

## 2023-12-01 ENCOUNTER — OFFICE VISIT (OUTPATIENT)
Dept: FAMILY MEDICINE CLINIC | Facility: CLINIC | Age: 38
End: 2023-12-01
Payer: COMMERCIAL

## 2023-12-01 VITALS
SYSTOLIC BLOOD PRESSURE: 110 MMHG | HEIGHT: 71 IN | OXYGEN SATURATION: 97 % | BODY MASS INDEX: 31.36 KG/M2 | HEART RATE: 97 BPM | TEMPERATURE: 97.8 F | DIASTOLIC BLOOD PRESSURE: 80 MMHG | RESPIRATION RATE: 18 BRPM | WEIGHT: 224 LBS

## 2023-12-01 DIAGNOSIS — A60.00 HERPES SIMPLEX INFECTION OF GENITOURINARY SYSTEM: ICD-10-CM

## 2023-12-01 PROCEDURE — 99213 OFFICE O/P EST LOW 20 MIN: CPT

## 2023-12-01 RX ORDER — VALACYCLOVIR HYDROCHLORIDE 500 MG/1
500 TABLET, FILM COATED ORAL 2 TIMES DAILY
Qty: 20 TABLET | Refills: 0 | Status: SHIPPED | OUTPATIENT
Start: 2023-12-01 | End: 2023-12-11

## 2023-12-01 NOTE — ASSESSMENT & PLAN NOTE
Treat with valacyclovir 500 mg twice a day for full 10 days if he continues to get frequent breakouts he might need to be on suppressive therapy  We will continue to monitor

## 2023-12-01 NOTE — PROGRESS NOTES
Assessment/Plan:         Problem List Items Addressed This Visit     Herpes simplex infection of genitourinary system     Treat with valacyclovir 500 mg twice a day for full 10 days if he continues to get frequent breakouts he might need to be on suppressive therapy  We will continue to monitor         Relevant Medications    valACYclovir (VALTREX) 500 mg tablet         Subjective:      Patient ID: Aurora Chaves is a 41 y.o. male. Patient here for sick visit. He had a breakout of genital herpes again. Recently finished valacyclovir he never had this kind of quick break out again has seen some more lesions on the penis no fever no chills no urethral discharge no other complaints        The following portions of the patient's history were reviewed and updated as appropriate:   Past Medical History:  He has a past medical history of Asthma and Genital herpes. ,  _______________________________________________________________________  Medical Problems:  does not have any pertinent problems on file.,  _______________________________________________________________________  Past Surgical History:   has a past surgical history that includes Fracture surgery (Left). ,  _______________________________________________________________________  Family History:  family history includes Cancer in his father and paternal grandfather; Diabetes in his paternal grandfather; Emphysema in his maternal grandfather.,  _______________________________________________________________________  Social History:   reports that he has quit smoking. His smoking use included cigarettes. He has never used smokeless tobacco. He reports current alcohol use. He reports current drug use. Drug: Marijuana. ,  _______________________________________________________________________  Allergies:  has No Known Allergies. .  _______________________________________________________________________  Current Outpatient Medications   Medication Sig Dispense Refill   • valACYclovir (VALTREX) 500 mg tablet Take 1 tablet (500 mg total) by mouth 2 (two) times a day for 10 days 20 tablet 0     No current facility-administered medications for this visit.     _______________________________________________________________________  Review of Systems   Constitutional:  Negative for chills and fever. HENT:  Negative for congestion and sore throat ( slight for the last few days). Respiratory:  Negative for cough and shortness of breath. Cardiovascular:  Negative for chest pain and leg swelling. Gastrointestinal:  Positive for nausea. Negative for abdominal pain, anal bleeding, blood in stool, constipation, diarrhea and vomiting. Genitourinary:  Negative for dysuria, frequency, penile discharge, penile pain and penile swelling. Musculoskeletal: Negative. Skin:  Positive for rash. Neurological:  Negative for headaches. Psychiatric/Behavioral:  Negative for dysphoric mood, sleep disturbance and suicidal ideas. Objective:  Vitals:    12/01/23 1233   BP: 110/80   BP Location: Right arm   Patient Position: Sitting   Cuff Size: Standard   Pulse: 97   Resp: 18   Temp: 97.8 °F (36.6 °C)   TempSrc: Temporal   SpO2: 97%   Weight: 102 kg (224 lb)   Height: 5' 11" (1.803 m)     Body mass index is 31.24 kg/m². Physical Exam  Vitals reviewed. Constitutional:       General: He is not in acute distress. HENT:      Right Ear: External ear normal.      Left Ear: External ear normal.      Nose: Nose normal.      Mouth/Throat:      Mouth: Mucous membranes are moist.   Eyes:      Conjunctiva/sclera: Conjunctivae normal.   Cardiovascular:      Rate and Rhythm: Normal rate and regular rhythm. Heart sounds: No murmur heard. Pulmonary:      Effort: Pulmonary effort is normal. No respiratory distress. Breath sounds: No wheezing. Abdominal:      General: There is no distension. Palpations: Abdomen is soft. Tenderness:  There is no abdominal tenderness. Musculoskeletal:      Right lower leg: No edema. Left lower leg: No edema. Lymphadenopathy:      Cervical: No cervical adenopathy. Skin:     Findings: Lesion (On the penis consistent with genital herpes) present. Neurological:      Mental Status: He is alert and oriented to person, place, and time.    Psychiatric:         Mood and Affect: Mood normal.

## 2023-12-22 ENCOUNTER — TELEPHONE (OUTPATIENT)
Dept: FAMILY MEDICINE CLINIC | Facility: CLINIC | Age: 38
End: 2023-12-22

## 2023-12-22 DIAGNOSIS — A60.00 HERPES SIMPLEX INFECTION OF GENITOURINARY SYSTEM: ICD-10-CM

## 2023-12-22 RX ORDER — VALACYCLOVIR HYDROCHLORIDE 500 MG/1
500 TABLET, FILM COATED ORAL 2 TIMES DAILY
Qty: 20 TABLET | Refills: 0 | Status: SHIPPED | OUTPATIENT
Start: 2023-12-22 | End: 2024-01-01

## 2023-12-22 NOTE — TELEPHONE ENCOUNTER
Reason for call:   [x] Refill   [] Prior Auth  [] Other:     Office:   [x] PCP/Provider -   [] Specialty/Provider -     Medication: Valrex    Dose/Frequency: 500 mg     Quantity: #20    Pharmacy: [facility]    Does the patient have enough for 3 days?   [] Yes   [x] No - Send as HP to POD

## 2023-12-22 NOTE — TELEPHONE ENCOUNTER
Called and left message for patient to call office if he still needs us, at lunch there were a few missed calls from him

## 2024-01-29 DIAGNOSIS — A60.00 HERPES SIMPLEX INFECTION OF GENITOURINARY SYSTEM: ICD-10-CM

## 2024-01-29 RX ORDER — VALACYCLOVIR HYDROCHLORIDE 500 MG/1
500 TABLET, FILM COATED ORAL 2 TIMES DAILY
Qty: 20 TABLET | Refills: 0 | Status: SHIPPED | OUTPATIENT
Start: 2024-01-29 | End: 2024-01-29 | Stop reason: SDUPTHER

## 2024-01-29 RX ORDER — VALACYCLOVIR HYDROCHLORIDE 500 MG/1
500 TABLET, FILM COATED ORAL 2 TIMES DAILY
Qty: 20 TABLET | Refills: 0 | Status: SHIPPED | OUTPATIENT
Start: 2024-01-29 | End: 2024-02-08

## 2024-01-29 NOTE — TELEPHONE ENCOUNTER
Patient called in stating that he needed the omeprazole to be sent to the [facility] in [place] not the [facility] in [facility].

## 2024-01-29 NOTE — TELEPHONE ENCOUNTER
Patient called the office requesting a medication refill. Medication sent to provider for approval.

## 2024-03-18 DIAGNOSIS — A60.00 HERPES SIMPLEX INFECTION OF GENITOURINARY SYSTEM: ICD-10-CM

## 2024-03-18 RX ORDER — VALACYCLOVIR HYDROCHLORIDE 500 MG/1
500 TABLET, FILM COATED ORAL 2 TIMES DAILY
Qty: 20 TABLET | Refills: 0 | Status: SHIPPED | OUTPATIENT
Start: 2024-03-18 | End: 2024-03-28

## 2024-03-18 NOTE — TELEPHONE ENCOUNTER
Reason for call:   [x] Refill   [] Prior Auth  [] Other:     Office:   [x] PCP/Provider - Meena Longoria MD   [] Specialty/Provider -     Medication:     valACYclovir (VALTREX) 500 mg tablet       Dose/Frequency: 500 mg, Oral, 2 times daily     Quantity: 20    Pharmacy: [id number] - [place] - [address]     Does the patient have enough for 3 days?   [] Yes   [x] No - Send as HP to POD

## 2024-04-18 DIAGNOSIS — A60.00 HERPES SIMPLEX INFECTION OF GENITOURINARY SYSTEM: ICD-10-CM

## 2024-04-18 RX ORDER — VALACYCLOVIR HYDROCHLORIDE 500 MG/1
500 TABLET, FILM COATED ORAL 2 TIMES DAILY
Qty: 20 TABLET | Refills: 0 | Status: SHIPPED | OUTPATIENT
Start: 2024-04-18 | End: 2024-04-28

## 2024-04-18 NOTE — TELEPHONE ENCOUNTER
Reason for call: Please route to [facility] .patient requested to make sure this goes to the [facility] below.  [x] Refill   [] Prior Auth  [] Other:     Office:   [x] PCP/Provider Dr Longoria   [] Specialty/Provider -     Medication: valtrex     Dose/Frequency: 500mg BID     Quantity:     Pharmacy: [facility]     Does the patient have enough for 3 days?   [] Yes   [x] No - Send as HP to POD

## 2024-05-20 DIAGNOSIS — A60.00 HERPES SIMPLEX INFECTION OF GENITOURINARY SYSTEM: ICD-10-CM

## 2024-05-20 RX ORDER — VALACYCLOVIR HYDROCHLORIDE 500 MG/1
500 TABLET, FILM COATED ORAL 2 TIMES DAILY
Qty: 20 TABLET | Refills: 0 | Status: SHIPPED | OUTPATIENT
Start: 2024-05-20 | End: 2024-05-30

## 2024-06-10 DIAGNOSIS — A60.00 HERPES SIMPLEX INFECTION OF GENITOURINARY SYSTEM: ICD-10-CM

## 2024-06-10 RX ORDER — VALACYCLOVIR HYDROCHLORIDE 500 MG/1
500 TABLET, FILM COATED ORAL 2 TIMES DAILY
Qty: 20 TABLET | Refills: 0 | Status: SHIPPED | OUTPATIENT
Start: 2024-06-10 | End: 2024-06-20

## 2024-06-10 NOTE — TELEPHONE ENCOUNTER
Reason for call:   [x] Refill   [] Prior Auth  [] Other:     Office:    PCP/Provider -   [x][] Specialty/Provider -     Medication: VALTREX    Dose/Frequency: 500 MG    Quantity: 20    Pharmacy:   [facility] #[id number] - [place] - [address]  [address] [place] [address]  Phone: [phone]  Fax: [phone]  JOSÉ MIGUEL #: [id number]     Does the patient have enough for 3 days?   [] Yes   [x] No - Send as HP to POD

## 2024-06-27 DIAGNOSIS — A60.00 HERPES SIMPLEX INFECTION OF GENITOURINARY SYSTEM: ICD-10-CM

## 2024-06-27 RX ORDER — VALACYCLOVIR HYDROCHLORIDE 500 MG/1
500 TABLET, FILM COATED ORAL 2 TIMES DAILY
Qty: 20 TABLET | Refills: 0 | Status: SHIPPED | OUTPATIENT
Start: 2024-06-27 | End: 2024-07-07

## 2024-07-22 DIAGNOSIS — A60.00 HERPES SIMPLEX INFECTION OF GENITOURINARY SYSTEM: ICD-10-CM

## 2024-07-22 RX ORDER — VALACYCLOVIR HYDROCHLORIDE 500 MG/1
500 TABLET, FILM COATED ORAL 2 TIMES DAILY
Qty: 20 TABLET | Refills: 0 | Status: SHIPPED | OUTPATIENT
Start: 2024-07-22 | End: 2024-08-02 | Stop reason: SDUPTHER

## 2024-08-01 ENCOUNTER — TELEPHONE (OUTPATIENT)
Age: 39
End: 2024-08-01

## 2024-08-01 DIAGNOSIS — A60.00 HERPES SIMPLEX INFECTION OF GENITOURINARY SYSTEM: ICD-10-CM

## 2024-08-01 RX ORDER — VALACYCLOVIR HYDROCHLORIDE 500 MG/1
500 TABLET, FILM COATED ORAL 2 TIMES DAILY
Qty: 20 TABLET | Refills: 0 | OUTPATIENT
Start: 2024-08-01 | End: 2024-08-11

## 2024-08-01 NOTE — TELEPHONE ENCOUNTER
Reason for call: [place] leaves the country on Monday August 05, 2024 and state he only have 2 pills on hand, he would like to take valtrex with him in the event he need it he does not want to be without it.      [x] Refill   [] Prior Auth  [] Other:     Office:   [x] PCP/Provider -   [] Specialty/Provider -     Medication: Valtrex     Dose/Frequency: 500mg BID for 10D     Quantity: 20    Pharmacy: [facility] [address] on file     Does the patient have enough for 3 days?   [] Yes   [x] No - Send as HP to POD

## 2024-08-01 NOTE — TELEPHONE ENCOUNTER
Patient called and is going out of [place] on [place], is requesting a refill on Valtrex please, still having some symptoms like burning and tenderness at site

## 2024-08-02 DIAGNOSIS — A60.00 HERPES SIMPLEX INFECTION OF GENITOURINARY SYSTEM: ICD-10-CM

## 2024-08-02 RX ORDER — VALACYCLOVIR HYDROCHLORIDE 500 MG/1
500 TABLET, FILM COATED ORAL 2 TIMES DAILY
Qty: 20 TABLET | Refills: 0 | Status: SHIPPED | OUTPATIENT
Start: 2024-08-02 | End: 2024-08-12

## 2024-08-29 DIAGNOSIS — A60.00 HERPES SIMPLEX INFECTION OF GENITOURINARY SYSTEM: ICD-10-CM

## 2024-08-29 RX ORDER — VALACYCLOVIR HYDROCHLORIDE 500 MG/1
500 TABLET, FILM COATED ORAL 2 TIMES DAILY
Qty: 20 TABLET | Refills: 0 | Status: SHIPPED | OUTPATIENT
Start: 2024-08-29 | End: 2024-09-08

## 2024-10-24 DIAGNOSIS — A60.00 HERPES SIMPLEX INFECTION OF GENITOURINARY SYSTEM: ICD-10-CM

## 2024-10-24 RX ORDER — VALACYCLOVIR HYDROCHLORIDE 500 MG/1
500 TABLET, FILM COATED ORAL 2 TIMES DAILY
Qty: 20 TABLET | Refills: 2 | Status: SHIPPED | OUTPATIENT
Start: 2024-10-24 | End: 2024-11-23

## 2024-11-09 DIAGNOSIS — A60.00 HERPES SIMPLEX INFECTION OF GENITOURINARY SYSTEM: ICD-10-CM

## 2024-11-09 NOTE — TELEPHONE ENCOUNTER
Medication Refill Request     Name  valACYclovir (VALTREX) 500 mg tablet   Dose/Frequency Take 1 tablet (500 mg total) by mouth 2 (two) times a day,   Quantity  20 tablet   Verified pharmacy   [x]  Verified ordering Provider   [x]  Does patient have enough for the next 3 days? Yes [] No [x]

## 2024-11-10 RX ORDER — VALACYCLOVIR HYDROCHLORIDE 500 MG/1
500 TABLET, FILM COATED ORAL 2 TIMES DAILY
Qty: 20 TABLET | Refills: 2 | Status: SHIPPED | OUTPATIENT
Start: 2024-11-10 | End: 2024-12-10

## 2024-11-20 ENCOUNTER — TELEPHONE (OUTPATIENT)
Age: 39
End: 2024-11-20

## 2024-11-20 NOTE — TELEPHONE ENCOUNTER
Patient called the RX Refill Line. Message is being forwarded to the office.     Patient is requesting a message be sent to the office regarding   valACYclovir (VALTREX) 500 mg tablet. Patient was informed he has two refills remaining. He states she was told by the Pharmacy that he does not have any refills with this script. Patient would like if the office can clarify this as he is without medication. Please review.    [id number] [place] - [address]  [address] [facility] [place] [address]  Phone: [phone]  Fax: [phone]    Please contact patient at [phone]. Patient would like a call back once office speaks with Pharmacy

## 2024-11-20 NOTE — TELEPHONE ENCOUNTER
Spoke with someone from [facility]. I stated patient will needs this medication by today or tomorrow. She stated it will be processed after Friday. Called twice and was not successful transferred to have the medication be ready sooner.

## 2025-01-02 DIAGNOSIS — A60.00 HERPES SIMPLEX INFECTION OF GENITOURINARY SYSTEM: ICD-10-CM

## 2025-01-02 NOTE — TELEPHONE ENCOUNTER
Reason for call:   [x] Refill   [] Prior Auth  [] Other:     Office:   [x] PCP/Provider -   [] Specialty/Provider -     Medication: valACYclovir (VALTREX) 500 mg tablet    Dose/Frequency:  Take 1 tablet (500 mg total) by mouth 2 (two) times a day     Quantity: 20 tablet     Pharmacy: [facility] #[id number] - [place] - [address]     Does the patient have enough for 3 days?   [] Yes   [x] No - Send as HP to POD

## 2025-01-03 RX ORDER — VALACYCLOVIR HYDROCHLORIDE 500 MG/1
500 TABLET, FILM COATED ORAL 2 TIMES DAILY
Qty: 20 TABLET | Refills: 0 | Status: SHIPPED | OUTPATIENT
Start: 2025-01-03 | End: 2025-02-02

## 2025-01-27 ENCOUNTER — RESULTS FOLLOW-UP (OUTPATIENT)
Dept: EMERGENCY DEPT | Facility: HOSPITAL | Age: 40
End: 2025-01-27

## 2025-01-27 ENCOUNTER — TELEPHONE (OUTPATIENT)
Age: 40
End: 2025-01-27

## 2025-01-27 ENCOUNTER — HOSPITAL ENCOUNTER (EMERGENCY)
Facility: HOSPITAL | Age: 40
Discharge: HOME | End: 2025-01-27
Attending: EMERGENCY MEDICINE
Payer: OTHER MISCELLANEOUS

## 2025-01-27 ENCOUNTER — APPOINTMENT (OUTPATIENT)
Dept: URGENT CARE | Facility: MEDICAL CENTER | Age: 40
End: 2025-01-27

## 2025-01-27 ENCOUNTER — APPOINTMENT (EMERGENCY)
Dept: RADIOLOGY | Facility: HOSPITAL | Age: 40
End: 2025-01-27
Payer: OTHER MISCELLANEOUS

## 2025-01-27 VITALS
DIASTOLIC BLOOD PRESSURE: 71 MMHG | HEART RATE: 106 BPM | TEMPERATURE: 97.9 F | SYSTOLIC BLOOD PRESSURE: 144 MMHG | RESPIRATION RATE: 18 BRPM | OXYGEN SATURATION: 100 %

## 2025-01-27 DIAGNOSIS — S82.391A: ICD-10-CM

## 2025-01-27 DIAGNOSIS — S82.839A CLOSED FRACTURE OF DISTAL FIBULA: Primary | ICD-10-CM

## 2025-01-27 PROCEDURE — 99284 EMERGENCY DEPT VISIT MOD MDM: CPT

## 2025-01-27 PROCEDURE — 90471 IMMUNIZATION ADMIN: CPT

## 2025-01-27 PROCEDURE — 73610 X-RAY EXAM OF ANKLE: CPT

## 2025-01-27 PROCEDURE — 90715 TDAP VACCINE 7 YRS/> IM: CPT

## 2025-01-27 PROCEDURE — 99283 EMERGENCY DEPT VISIT LOW MDM: CPT

## 2025-01-27 PROCEDURE — 29515 APPLICATION SHORT LEG SPLINT: CPT

## 2025-01-27 RX ORDER — OXYCODONE HYDROCHLORIDE 5 MG/1
5 TABLET ORAL ONCE
Refills: 0 | Status: COMPLETED | OUTPATIENT
Start: 2025-01-27 | End: 2025-01-27

## 2025-01-27 RX ORDER — OXYCODONE HYDROCHLORIDE 5 MG/1
5 TABLET ORAL EVERY 6 HOURS PRN
Qty: 8 TABLET | Refills: 0 | Status: SHIPPED | OUTPATIENT
Start: 2025-01-27 | End: 2025-02-06

## 2025-01-27 RX ORDER — ACETAMINOPHEN 325 MG/1
975 TABLET ORAL ONCE
Status: COMPLETED | OUTPATIENT
Start: 2025-01-27 | End: 2025-01-27

## 2025-01-27 RX ORDER — IBUPROFEN 600 MG/1
600 TABLET, FILM COATED ORAL EVERY 6 HOURS PRN
Qty: 30 TABLET | Refills: 0 | Status: SHIPPED | OUTPATIENT
Start: 2025-01-27

## 2025-01-27 RX ORDER — ACETAMINOPHEN 500 MG
500 TABLET ORAL EVERY 6 HOURS PRN
Qty: 30 TABLET | Refills: 0 | Status: SHIPPED | OUTPATIENT
Start: 2025-01-27

## 2025-01-27 RX ADMIN — TETANUS TOXOID, REDUCED DIPHTHERIA TOXOID AND ACELLULAR PERTUSSIS VACCINE, ADSORBED 0.5 ML: 5; 2.5; 8; 8; 2.5 SUSPENSION INTRAMUSCULAR at 02:25

## 2025-01-27 RX ADMIN — ACETAMINOPHEN 975 MG: 325 TABLET, FILM COATED ORAL at 02:02

## 2025-01-27 RX ADMIN — OXYCODONE 5 MG: 5 TABLET ORAL at 01:23

## 2025-01-27 NOTE — ED PROVIDER NOTES
Time reflects when diagnosis was documented in both MDM as applicable and the Disposition within this note       Time User Action Codes Description Comment    1/27/2025  1:57 AM Eleonora Cosme Add [S82.839A] Closed fracture of distal fibula     1/27/2025  1:58 AM Eleonora Cosme Add [S82.391A] Fracture of posterior malleolus of right tibia           ED Disposition       ED Disposition   Discharge    Condition   Stable    Date/Time   Mon Jan 27, 2025  1:57 AM    Comment   Matthew Blanco discharge to home/self care.                   Assessment & Plan       Medical Decision Making  DDX including but not limited to: contusion, sprain, strain, fracture, dislocation  Will obtain XR to evaluate for fracture, dislocation     X-ray consistent with distal fibular fracture, posterior malleolus of the tibia fracture.  Splint applied as below.  Referral to podiatry placed.  Patient agreeable with plan for close follow-up.  Tdap given given small abrasion to left hand.    At the time of discharge, the patient is in no acute distress. I discussed with the patient the diagnosis, treatment plan, follow-up, return precautions, and discharge instructions; they were given the opportunity to ask questions and verbalized understanding.    Problems Addressed:  Closed fracture of distal fibula: acute illness or injury  Fracture of posterior malleolus of right tibia: acute illness or injury    Amount and/or Complexity of Data Reviewed  External Data Reviewed: notes.  Radiology: ordered and independent interpretation performed. Decision-making details documented in ED Course.    Risk  OTC drugs.  Prescription drug management.             Medications   oxyCODONE (ROXICODONE) IR tablet 5 mg (5 mg Oral Given 1/27/25 0123)   acetaminophen (TYLENOL) tablet 975 mg (975 mg Oral Given 1/27/25 0202)   tetanus-diphtheria-acellular pertussis (BOOSTRIX) IM injection 0.5 mL (0.5 mL Intramuscular Given 1/27/25 0225)       ED Risk Strat Scores                           SBIRT 20yo+      Flowsheet Row Most Recent Value   Initial Alcohol Screen: US AUDIT-C     1. How often do you have a drink containing alcohol? 0 Filed at: 01/27/2025 0105   2. How many drinks containing alcohol do you have on a typical day you are drinking?  0 Filed at: 01/27/2025 0105   3a. Male UNDER 65: How often do you have five or more drinks on one occasion? 0 Filed at: 01/27/2025 0105   Audit-C Score 0 Filed at: 01/27/2025 0105   ERICKA: How many times in the past year have you...    Used an illegal drug or used a prescription medication for non-medical reasons? Never Filed at: 01/27/2025 0105                            History of Present Illness       Chief Complaint   Patient presents with    Ankle Injury     Pt states was coming down steps when he slipped and fell and hurt his right ankle, pt reports did not hurt at first but felt a pop and pain has been increasing       Past Medical History:   Diagnosis Date    Asthma     Genital herpes       Past Surgical History:   Procedure Laterality Date    FRACTURE SURGERY Left     tib/fib      Family History   Problem Relation Age of Onset    Cancer Father         pancreas    Emphysema Maternal Grandfather     Cancer Paternal Grandfather         prostate    Diabetes Paternal Grandfather       Social History     Tobacco Use    Smoking status: Former     Current packs/day: 0.00     Types: Cigarettes    Smokeless tobacco: Never   Vaping Use    Vaping status: Never Used   Substance Use Topics    Alcohol use: Yes     Comment: few times a month    Drug use: Yes     Types: Marijuana      E-Cigarette/Vaping    E-Cigarette Use Never User       E-Cigarette/Vaping Substances      I have reviewed and agree with the history as documented.     The patient is a 39-year-old male with no significant past medical history presenting to the ED for evaluation of right ankle pain which began just prior to arrival when he was walking down the stairs, and everted his  ankle. This occurred while he was at work.  He reports he instantly had difficulty bearing weight, which is only worsened since.  He also reports feeling a pop during the fall.  He has a small scrape to his left hand, but denies pain to this area.  He otherwise denies numbness, paresthesia, head strike, neck pain, other injury.        Review of Systems   Constitutional:  Negative for chills and fever.   Musculoskeletal:  Positive for arthralgias and gait problem.   Skin:  Positive for wound.   Neurological:  Negative for weakness and numbness.           Objective       ED Triage Vitals   Temperature Pulse Blood Pressure Respirations SpO2 Patient Position - Orthostatic VS   01/27/25 0040 01/27/25 0038 01/27/25 0038 01/27/25 0038 01/27/25 0038 01/27/25 0038   97.9 °F (36.6 °C) (!) 106 144/71 18 100 % Sitting      Temp Source Heart Rate Source BP Location FiO2 (%) Pain Score    01/27/25 0040 01/27/25 0038 01/27/25 0038 -- 01/27/25 0123    Oral Monitor Right arm  10 - Worst Possible Pain      Vitals      Date and Time Temp Pulse SpO2 Resp BP Pain Score FACES Pain Rating User   01/27/25 0202 -- -- -- -- -- 10 - Worst Possible Pain -- DS   01/27/25 0123 -- -- -- -- -- 10 - Worst Possible Pain -- DS   01/27/25 0040 97.9 °F (36.6 °C) -- -- -- -- -- -- ES   01/27/25 0038 -- 106 100 % 18 144/71 -- -- ES            Physical Exam  Vitals and nursing note reviewed.   Constitutional:       General: He is not in acute distress.     Appearance: He is well-developed.   HENT:      Head: Normocephalic and atraumatic.   Eyes:      Conjunctiva/sclera: Conjunctivae normal.   Cardiovascular:      Rate and Rhythm: Normal rate and regular rhythm.      Pulses: Normal pulses.   Pulmonary:      Effort: Pulmonary effort is normal. No respiratory distress.   Musculoskeletal:         General: Swelling present.      Cervical back: Neck supple.      Right knee: No bony tenderness. Normal range of motion. No tenderness.      Right lower leg: No  tenderness or bony tenderness.      Right ankle: Swelling and deformity present. Tenderness present over the lateral malleolus. Decreased range of motion (2/2 pain). Normal pulse.      Right Achilles Tendon: No tenderness or defects.      Right foot: Normal range of motion and normal capillary refill. No tenderness or bony tenderness. Normal pulse.   Skin:     General: Skin is warm and dry.      Capillary Refill: Capillary refill takes less than 2 seconds.      Comments: 0.5 cm linear abrasion to left hand   Neurological:      Mental Status: He is alert.   Psychiatric:         Mood and Affect: Mood normal.         Results Reviewed       None            XR ankle 3+ views RIGHT   ED Interpretation by Eleonora Cosme PA-C (01/27 0158)   Distal fibular fracture, and avulsion fracture of the posterior malleolus of the tibia as interpreted by me          Splint application    Date/Time: 1/27/2025 1:55 AM    Performed by: Eleonora Cosme PA-C  Authorized by: Eleonora Cosme PA-C  Universal Protocol:  Procedure performed by: (ED RN)  Consent: Verbal consent not obtained.  Risks and benefits: risks, benefits and alternatives were discussed  Consent given by: patient  Patient identity confirmed: verbally with patient    Pre-procedure details:     Sensation:  Normal  Procedure details:     Laterality:  Right    Location:  Ankle    Ankle:  R ankle    Splint type:  Short leg    Supplies:  Cotton padding, Ortho-Glass and elastic bandage  Post-procedure details:     Pain:  Improved    Sensation:  Normal    Patient tolerance of procedure:  Tolerated well, no immediate complications      ED Medication and Procedure Management   Prior to Admission Medications   Prescriptions Last Dose Informant Patient Reported? Taking?   valACYclovir (VALTREX) 500 mg tablet   No No   Sig: Take 1 tablet (500 mg total) by mouth 2 (two) times a day      Facility-Administered Medications: None     Discharge Medication List  as of 1/27/2025  2:23 AM        START taking these medications    Details   acetaminophen (TYLENOL) 500 mg tablet Take 1 tablet (500 mg total) by mouth every 6 (six) hours as needed for mild pain or moderate pain, Starting Mon 1/27/2025, Normal      ibuprofen (MOTRIN) 600 mg tablet Take 1 tablet (600 mg total) by mouth every 6 (six) hours as needed for mild pain, Starting Mon 1/27/2025, Normal      oxyCODONE (Roxicodone) 5 immediate release tablet Take 1 tablet (5 mg total) by mouth every 6 (six) hours as needed for moderate pain for up to 10 days Max Daily Amount: 20 mg, Starting Mon 1/27/2025, Until Thu 2/6/2025 at 2359, Normal           CONTINUE these medications which have NOT CHANGED    Details   valACYclovir (VALTREX) 500 mg tablet Take 1 tablet (500 mg total) by mouth 2 (two) times a day, Starting Fri 1/3/2025, Until Sun 2/2/2025, Normal             ED SEPSIS DOCUMENTATION   Time reflects when diagnosis was documented in both MDM as applicable and the Disposition within this note       Time User Action Codes Description Comment    1/27/2025  1:57 AM Eleonora Cosme [S82.839A] Closed fracture of distal fibula     1/27/2025  1:58 AM Eleonora Cosme [S82.391A] Fracture of posterior malleolus of right tibia                  Eleonora Cosme PA-C  01/27/25 0657

## 2025-01-27 NOTE — TELEPHONE ENCOUNTER
Caller: Matthew Simeon    Doctor and/or Office: [place] office     CB#: [phone]    Escalation: Appointment Requsting an appt for a slightly displaced ankle fracture. Patient has a STAT referral in [facility]. Requesting [facility]. Please return call. Thank you

## 2025-01-27 NOTE — DISCHARGE INSTRUCTIONS
Take Ibuprofen as prescribed as needed for mild to moderate pain. Do not take on an empty stomach.     Take Tylenol as prescribed as needed for mild to moderate pain    Take oxycodone as prescribed as needed for severe pain.  Do not combine with other medications that may cause sedation.  Do not take before driving, operating heavy machinery.  Do not combine with alcohol    Follow-up with podiatry soon as possible as discussed    Return to the emergency room for uncontrolled pain,numbness, color change to foot, chest pain, trouble breathing, or any other new or concerning symptoms

## 2025-01-27 NOTE — Clinical Note
Matthew Simeon was seen and treated in our emergency department on 1/27/2025.        No work until cleared by Family Doctor/Orthopedics        Diagnosis:     Matthew  is off the rest of the shift today.    He may return on this date:          If you have any questions or concerns, please don't hesitate to call.      Eleonora Cosme PA-C    ______________________________           _______________          _______________  Hospital Representative                              Date                                Time

## 2025-01-29 ENCOUNTER — TELEPHONE (OUTPATIENT)
Age: 40
End: 2025-01-29

## 2025-01-29 NOTE — TELEPHONE ENCOUNTER
Caller: Matthew Simeon    Doctor: Julius / [facility]    Reason for call: Matthew has a broken tibula and fibula right leg.  He has a workers comp number:  [id number] Can he be forced on?  Thank you.     Call back#: [phone]

## 2025-01-30 ENCOUNTER — APPOINTMENT (OUTPATIENT)
Dept: LAB | Facility: CLINIC | Age: 40
End: 2025-01-30
Payer: COMMERCIAL

## 2025-01-30 ENCOUNTER — OFFICE VISIT (OUTPATIENT)
Dept: PODIATRY | Facility: CLINIC | Age: 40
End: 2025-01-30

## 2025-01-30 ENCOUNTER — TELEPHONE (OUTPATIENT)
Age: 40
End: 2025-01-30

## 2025-01-30 VITALS — BODY MASS INDEX: 31.24 KG/M2 | HEIGHT: 71 IN

## 2025-01-30 DIAGNOSIS — Z01.818 PRE-OP EVALUATION: ICD-10-CM

## 2025-01-30 DIAGNOSIS — S82.841A BIMALLEOLAR FRACTURE, RIGHT, CLOSED, INITIAL ENCOUNTER: Primary | ICD-10-CM

## 2025-01-30 DIAGNOSIS — Z01.818 PRE-OP EVALUATION: Primary | ICD-10-CM

## 2025-01-30 LAB
ANION GAP SERPL CALCULATED.3IONS-SCNC: 10 MMOL/L (ref 4–13)
BASOPHILS # BLD AUTO: 0.04 THOUSANDS/ΜL (ref 0–0.1)
BASOPHILS NFR BLD AUTO: 0 % (ref 0–1)
BUN SERPL-MCNC: 15 MG/DL (ref 5–25)
CALCIUM SERPL-MCNC: 9.2 MG/DL (ref 8.4–10.2)
CHLORIDE SERPL-SCNC: 103 MMOL/L (ref 96–108)
CO2 SERPL-SCNC: 24 MMOL/L (ref 21–32)
CREAT SERPL-MCNC: 0.85 MG/DL (ref 0.6–1.3)
EOSINOPHIL # BLD AUTO: 0.17 THOUSAND/ΜL (ref 0–0.61)
EOSINOPHIL NFR BLD AUTO: 2 % (ref 0–6)
ERYTHROCYTE [DISTWIDTH] IN BLOOD BY AUTOMATED COUNT: 13.2 % (ref 11.6–15.1)
GFR SERPL CREATININE-BSD FRML MDRD: 110 ML/MIN/1.73SQ M
GLUCOSE P FAST SERPL-MCNC: 87 MG/DL (ref 65–99)
HCT VFR BLD AUTO: 41.6 % (ref 36.5–49.3)
HGB BLD-MCNC: 13.8 G/DL (ref 12–17)
IMM GRANULOCYTES # BLD AUTO: 0.07 THOUSAND/UL (ref 0–0.2)
IMM GRANULOCYTES NFR BLD AUTO: 1 % (ref 0–2)
LYMPHOCYTES # BLD AUTO: 2.45 THOUSANDS/ΜL (ref 0.6–4.47)
LYMPHOCYTES NFR BLD AUTO: 24 % (ref 14–44)
MCH RBC QN AUTO: 31.9 PG (ref 26.8–34.3)
MCHC RBC AUTO-ENTMCNC: 33.2 G/DL (ref 31.4–37.4)
MCV RBC AUTO: 96 FL (ref 82–98)
MONOCYTES # BLD AUTO: 0.99 THOUSAND/ΜL (ref 0.17–1.22)
MONOCYTES NFR BLD AUTO: 10 % (ref 4–12)
NEUTROPHILS # BLD AUTO: 6.33 THOUSANDS/ΜL (ref 1.85–7.62)
NEUTS SEG NFR BLD AUTO: 63 % (ref 43–75)
NRBC BLD AUTO-RTO: 0 /100 WBCS
PLATELET # BLD AUTO: 267 THOUSANDS/UL (ref 149–390)
PMV BLD AUTO: 10.5 FL (ref 8.9–12.7)
POTASSIUM SERPL-SCNC: 3.4 MMOL/L (ref 3.5–5.3)
RBC # BLD AUTO: 4.33 MILLION/UL (ref 3.88–5.62)
SODIUM SERPL-SCNC: 137 MMOL/L (ref 135–147)
WBC # BLD AUTO: 10.05 THOUSAND/UL (ref 4.31–10.16)

## 2025-01-30 PROCEDURE — 36415 COLL VENOUS BLD VENIPUNCTURE: CPT

## 2025-01-30 PROCEDURE — 99204 OFFICE O/P NEW MOD 45 MIN: CPT | Performed by: PODIATRIST

## 2025-01-30 PROCEDURE — 85025 COMPLETE CBC W/AUTO DIFF WBC: CPT

## 2025-01-30 PROCEDURE — 80048 BASIC METABOLIC PNL TOTAL CA: CPT

## 2025-01-30 RX ORDER — CHLORHEXIDINE GLUCONATE 40 MG/ML
SOLUTION TOPICAL DAILY PRN
Status: CANCELLED | OUTPATIENT
Start: 2025-01-30

## 2025-01-30 RX ORDER — OXYCODONE AND ACETAMINOPHEN 5; 325 MG/1; MG/1
1 TABLET ORAL EVERY 4 HOURS PRN
Qty: 40 TABLET | Refills: 0 | Status: SHIPPED | OUTPATIENT
Start: 2025-01-30

## 2025-01-30 RX ORDER — CHLORHEXIDINE GLUCONATE ORAL RINSE 1.2 MG/ML
15 SOLUTION DENTAL ONCE
Status: CANCELLED | OUTPATIENT
Start: 2025-01-30 | End: 2025-01-30

## 2025-01-30 NOTE — TELEPHONE ENCOUNTER
PA for OXY-ACET 5-325 MG  APPROVED     Date(s) approved January 30, 2025 to March 1, 2025         Patient advised by          []Neurelishart Message  []Phone call   [x]LMOM  []L/M to call office as no active Communication consent on file  []Unable to leave detailed message as VM not approved on Communication consent       Pharmacy advised by    [x]Fax  []Phone call    Approval letter scanned into Media Yes

## 2025-01-30 NOTE — PROGRESS NOTES
Name: Matthew Simeon      : 1985      MRN: [id number]  Encounter Provider: Vitor Gunderson DPM  Encounter Date: 2025   Encounter department: [facility] PODIATRY [place]    Assessment & Plan     1. Bimalleolar fracture, right, closed, initial encounter  -     Case request operating room: Right ankle open reduction with internal fixation of bimalleolar fracture; Standing  -     Case request operating room: Right ankle open reduction with internal fixation of bimalleolar fracture  -     oxyCODONE-acetaminophen (Percocet) 5-325 mg per tablet; Take 1 tablet by mouth every 4 (four) hours as needed for moderate pain for up to 40 doses Max Daily Amount: 6 tablets  -     apixaban (Eliquis) 2.5 mg; Take 1 tablet (2.5 mg total) by mouth 2 (two) times a day for 21 days Start the day after surgery  -     naloxone (NARCAN) 4 mg/0.1 mL nasal spray; Administer 1 spray into a nostril. If no response after 2-3 minutes, give another dose in the other nostril using a new spray.    Patient has a bimalleolar equivalent fracture with posterior malleolus fracture as well as a lateral malleolus fracture.    X-rays were reviewed which does show displaced fibular fracture with posterior malleoli are small fracture.  There is some mild gapping of the medial clear space.    He has pain at the level of the deltoid ligament.  Recommend surgical fixation of his distal fibular fracture with open reduction internal fixation of fibular fracture and stress imaging of syndesmosis.    I explained to patient risks and benefits of the procedure in detail.  Conservative options have been evaluated and exhausted. Complications of the procedure can include but are not limited to non-union, mal-union, delayed union, infection, prolonged pain, prolonged swelling, prolonged numbness, need for further surgery, wound healing problems, loss of function, need for removal of hardware or further surgery, painful scar, stiffness, arthritis,  "medical complications, recurrence, DVT, PE, death, nerve, tendon, ligament or blood vessel injury.  Patient understands the procedure and all questions have been answered to the patient satisfaction.  No guarantees have been given to the patient regarding outcome.  Will proceed with surgical intervention.  Patient has signed informed consent and understands the procedure and post operative course.  Will return approximately one week post op for evaluation.     I have reviewed the patients history in [facility] and there are no obvious contraindications with regard to starting the patient on narcotic pain medication.  I have discussed with the patient risks/benefits of narcotic pain medication use for post operative pain.     No follow-ups on file.    Subjective     Slipped on ice coming out of conference room.  Slipped and felt like ankle was loose.  Reported at work.  Patient was placed in splint and crutches.   at work.  No other areas of pain besides the local ankle.         Constitutional:  Negative for chills and fever.   Respiratory:  Negative for chest tightness and shortness of breath.    Gastrointestinal:  Negative for nausea and vomiting.     Current Outpatient Medications on File Prior to Visit   Medication Sig   • acetaminophen (TYLENOL) 500 mg tablet Take 1 tablet (500 mg total) by mouth every 6 (six) hours as needed for mild pain or moderate pain   • ibuprofen (MOTRIN) 600 mg tablet Take 1 tablet (600 mg total) by mouth every 6 (six) hours as needed for mild pain   • oxyCODONE (Roxicodone) 5 immediate release tablet Take 1 tablet (5 mg total) by mouth every 6 (six) hours as needed for moderate pain for up to 10 days Max Daily Amount: 20 mg   • valACYclovir (VALTREX) 500 mg tablet Take 1 tablet (500 mg total) by mouth 2 (two) times a day       Objective     Ht 5' 11\" (1.803 m) Comment: verbal  BMI 31.24 kg/m²     Vascular: Intact pedal pulses bilateral DP and PT.  Neurological: Gross " protective sensation intact bilateral  Musculoskeletal: Muscle strength bilateral intact with dorsiflexion, inversion, eversion and plantarflexion.  Tenderness on palpation noted at the level of the ankle fracture lateral, as well as tenderness on palpation to the deltoid ligament..  There is no tenderness on palpation of the Lisfranc ligament.   No tenderness with tib-fib squeeze.  No other areas of significant discomfort or tenderness on examination.  There is swelling and bruising noted to the lateral ankle.  No tenderness noted in the foot on palpation.  Dermatological: No open lesions or ulcerations noted bilateral.

## 2025-01-30 NOTE — TELEPHONE ENCOUNTER
Patient called Rx refill line to check on this PA-advised pt it was sent to his insurance and approved   Pt will contact the pharmacy

## 2025-01-30 NOTE — TELEPHONE ENCOUNTER
PA for OXY-ACET 5-325 MG SUBMITTED to [facility]    via      [x][employer]-Case ID # [id number]     [x]PA sent as URGENT    All office notes, labs and other pertaining documents and studies sent. Clinical questions answered. Awaiting determination from insurance company.     Turnaround time for your insurance to make a decision on your Prior Authorization can take 7-21 business days.

## 2025-01-31 ENCOUNTER — CONSULT (OUTPATIENT)
Age: 40
End: 2025-01-31
Payer: COMMERCIAL

## 2025-01-31 VITALS
TEMPERATURE: 97.3 F | SYSTOLIC BLOOD PRESSURE: 120 MMHG | OXYGEN SATURATION: 100 % | RESPIRATION RATE: 20 BRPM | WEIGHT: 220 LBS | HEIGHT: 71 IN | HEART RATE: 84 BPM | BODY MASS INDEX: 30.8 KG/M2 | DIASTOLIC BLOOD PRESSURE: 76 MMHG

## 2025-01-31 DIAGNOSIS — S82.841A BIMALLEOLAR FRACTURE, RIGHT, CLOSED, INITIAL ENCOUNTER: ICD-10-CM

## 2025-01-31 DIAGNOSIS — F32.5 MAJOR DEPRESSIVE DISORDER WITH SINGLE EPISODE, IN REMISSION (HCC): ICD-10-CM

## 2025-01-31 DIAGNOSIS — Z01.818 PREOPERATIVE EXAMINATION: Primary | ICD-10-CM

## 2025-01-31 PROCEDURE — 93000 ELECTROCARDIOGRAM COMPLETE: CPT | Performed by: INTERNAL MEDICINE

## 2025-01-31 PROCEDURE — 99214 OFFICE O/P EST MOD 30 MIN: CPT | Performed by: INTERNAL MEDICINE

## 2025-01-31 NOTE — PROGRESS NOTES
Pre-operative Clearance  Name: Matthew Simeon      : 1985      MRN: [id number]  Encounter Provider: Karen Dawson MD  Encounter Date: 2025   Encounter department: [facility] PRIMARY CARE    Assessment & Plan  Major depressive disorder with single episode, in remission (HCC)  Stable, currently not on medication         Preoperative examination  Recent blood work stable, EKG in the office was normal sinus rhythm.  Orders:    POCT ECG    Bimalleolar fracture, right, closed, initial encounter  Patient planned for ORIF, follow-up with podiatry       Pre-operative Clearance:     Revised Cardiac Risk Index:  RCI RISK CLASS I (0 risk factors, risk of major cardiac complications approximately 0.5%)    Clearance:  Patient is medically optimized (CLEARED) for proposed surgery without any additional cardiac testing.      Medication Instructions:   - Avoid herbs or non-directed vitamins one week prior to surgery    - Avoid aspirin containing medications or non-steroidal anti-inflammatory drugs one week preceding surgery    - May take tylenol for pain up until the night before surgery    - Acyclovir: Continue to take this medication on your normal schedule.  - Direct Xa Inhibitor (ie, Eliquis, Xarelto): Stop taking medication 3 days prior to procedure/surgery.  - Opioids: Continue to take this medication on your normal schedule.       History of Present Illness     Patient had injury to his right ankle last , was seen by podiatry, and is planned for ORIF of a bimalleolar right ankle fracture.  He denies any subjective complaints other than ongoing pain.  Patient is planned for ORIF on 2025 by       Review of Systems   Constitutional:  Negative for activity change, appetite change, chills, diaphoresis, fatigue, fever and unexpected weight change.   HENT:  Negative for congestion and sore throat.    Respiratory:  Negative for apnea, cough, choking, chest tightness, shortness of  breath, wheezing and stridor.    Cardiovascular:  Negative for chest pain, palpitations and leg swelling.   Gastrointestinal:  Negative for abdominal distention, abdominal pain, blood in stool, constipation, nausea and rectal pain.   Genitourinary:  Negative for dysuria, flank pain, frequency and urgency.   Musculoskeletal:  Negative for arthralgias, back pain, gait problem, joint swelling and myalgias.        Right ankle pain   Skin:  Negative for color change, pallor and rash.   Neurological:  Negative for headaches.     Past Medical History   Past Medical History:   Diagnosis Date    Asthma     Genital herpes      Past Surgical History:   Procedure Laterality Date    FRACTURE SURGERY Left     tib/fib     Family History   Problem Relation Age of Onset    Cancer Father         pancreas    Emphysema Maternal Grandfather     Cancer Paternal Grandfather         prostate    Diabetes Paternal Grandfather      Social History     Tobacco Use    Smoking status: Former     Current packs/day: 0.00     Types: Cigarettes    Smokeless tobacco: Never   Vaping Use    Vaping status: Every Day    Substances: Nicotine   Substance and Sexual Activity    Alcohol use: Yes     Comment: few times a month    Drug use: Not Currently     Types: Marijuana    Sexual activity: Yes     Partners: Female     Birth control/protection: I.U.D.     Current Outpatient Medications on File Prior to Visit   Medication Sig    acetaminophen (TYLENOL) 500 mg tablet Take 1 tablet (500 mg total) by mouth every 6 (six) hours as needed for mild pain or moderate pain    apixaban (Eliquis) 2.5 mg Take 1 tablet (2.5 mg total) by mouth 2 (two) times a day for 21 days Start the day after surgery    ibuprofen (MOTRIN) 600 mg tablet Take 1 tablet (600 mg total) by mouth every 6 (six) hours as needed for mild pain    oxyCODONE (Roxicodone) 5 immediate release tablet Take 1 tablet (5 mg total) by mouth every 6 (six) hours as needed for moderate pain for up to 10 days  "Max Daily Amount: 20 mg    oxyCODONE-acetaminophen (Percocet) 5-325 mg per tablet Take 1 tablet by mouth every 4 (four) hours as needed for moderate pain for up to 40 doses Max Daily Amount: 6 tablets    naloxone (NARCAN) 4 mg/0.1 mL nasal spray Administer 1 spray into a nostril. If no response after 2-3 minutes, give another dose in the other nostril using a new spray. (Patient not taking: Reported on 1/31/2025)    valACYclovir (VALTREX) 500 mg tablet Take 1 tablet (500 mg total) by mouth 2 (two) times a day (Patient not taking: Reported on 1/31/2025)     No Known Allergies  Objective   /76 (BP Location: Right arm, Patient Position: Sitting, Cuff Size: Standard)   Pulse 84   Temp (!) 97.3 °F (36.3 °C) (Tympanic)   Resp 20   Ht 5' 11\" (1.803 m)   Wt 99.8 kg (220 lb)   SpO2 100%   BMI 30.68 kg/m²     Physical Exam  Constitutional:       General: He is not in acute distress.     Appearance: Normal appearance. He is normal weight. He is not ill-appearing, toxic-appearing or diaphoretic.   Cardiovascular:      Rate and Rhythm: Normal rate and regular rhythm.      Pulses: Normal pulses.      Heart sounds: Normal heart sounds. No murmur heard.     No gallop.   Pulmonary:      Effort: Pulmonary effort is normal. No respiratory distress.      Breath sounds: Normal breath sounds. No stridor. No wheezing, rhonchi or rales.   Chest:      Chest wall: No tenderness.   Musculoskeletal:      Right lower leg: Edema present.      Left lower leg: No edema.      Comments: Right ankle in boot with Ace wrap   Neurological:      Mental Status: He is alert and oriented to person, place, and time.           Karen Dawson MD  "

## 2025-01-31 NOTE — H&P (VIEW-ONLY)
Pre-operative Clearance  Name: Matthew Simeon      : 1985      MRN: [id number]  Encounter Provider: Karen Dawson MD  Encounter Date: 2025   Encounter department: [facility] PRIMARY CARE    Assessment & Plan  Major depressive disorder with single episode, in remission (HCC)  Stable, currently not on medication         Preoperative examination  Recent blood work stable, EKG in the office was normal sinus rhythm.  Orders:    POCT ECG    Bimalleolar fracture, right, closed, initial encounter  Patient planned for ORIF, follow-up with podiatry       Pre-operative Clearance:     Revised Cardiac Risk Index:  RCI RISK CLASS I (0 risk factors, risk of major cardiac complications approximately 0.5%)    Clearance:  Patient is medically optimized (CLEARED) for proposed surgery without any additional cardiac testing.      Medication Instructions:   - Avoid herbs or non-directed vitamins one week prior to surgery    - Avoid aspirin containing medications or non-steroidal anti-inflammatory drugs one week preceding surgery    - May take tylenol for pain up until the night before surgery    - Acyclovir: Continue to take this medication on your normal schedule.  - Direct Xa Inhibitor (ie, Eliquis, Xarelto): Stop taking medication 3 days prior to procedure/surgery.  - Opioids: Continue to take this medication on your normal schedule.       History of Present Illness     Patient had injury to his right ankle last , was seen by podiatry, and is planned for ORIF of a bimalleolar right ankle fracture.  He denies any subjective complaints other than ongoing pain.  Patient is planned for ORIF on 2025 by       Review of Systems   Constitutional:  Negative for activity change, appetite change, chills, diaphoresis, fatigue, fever and unexpected weight change.   HENT:  Negative for congestion and sore throat.    Respiratory:  Negative for apnea, cough, choking, chest tightness, shortness of  breath, wheezing and stridor.    Cardiovascular:  Negative for chest pain, palpitations and leg swelling.   Gastrointestinal:  Negative for abdominal distention, abdominal pain, blood in stool, constipation, nausea and rectal pain.   Genitourinary:  Negative for dysuria, flank pain, frequency and urgency.   Musculoskeletal:  Negative for arthralgias, back pain, gait problem, joint swelling and myalgias.        Right ankle pain   Skin:  Negative for color change, pallor and rash.   Neurological:  Negative for headaches.     Past Medical History   Past Medical History:   Diagnosis Date    Asthma     Genital herpes      Past Surgical History:   Procedure Laterality Date    FRACTURE SURGERY Left     tib/fib     Family History   Problem Relation Age of Onset    Cancer Father         pancreas    Emphysema Maternal Grandfather     Cancer Paternal Grandfather         prostate    Diabetes Paternal Grandfather      Social History     Tobacco Use    Smoking status: Former     Current packs/day: 0.00     Types: Cigarettes    Smokeless tobacco: Never   Vaping Use    Vaping status: Every Day    Substances: Nicotine   Substance and Sexual Activity    Alcohol use: Yes     Comment: few times a month    Drug use: Not Currently     Types: Marijuana    Sexual activity: Yes     Partners: Female     Birth control/protection: I.U.D.     Current Outpatient Medications on File Prior to Visit   Medication Sig    acetaminophen (TYLENOL) 500 mg tablet Take 1 tablet (500 mg total) by mouth every 6 (six) hours as needed for mild pain or moderate pain    apixaban (Eliquis) 2.5 mg Take 1 tablet (2.5 mg total) by mouth 2 (two) times a day for 21 days Start the day after surgery    ibuprofen (MOTRIN) 600 mg tablet Take 1 tablet (600 mg total) by mouth every 6 (six) hours as needed for mild pain    oxyCODONE (Roxicodone) 5 immediate release tablet Take 1 tablet (5 mg total) by mouth every 6 (six) hours as needed for moderate pain for up to 10 days  "Max Daily Amount: 20 mg    oxyCODONE-acetaminophen (Percocet) 5-325 mg per tablet Take 1 tablet by mouth every 4 (four) hours as needed for moderate pain for up to 40 doses Max Daily Amount: 6 tablets    naloxone (NARCAN) 4 mg/0.1 mL nasal spray Administer 1 spray into a nostril. If no response after 2-3 minutes, give another dose in the other nostril using a new spray. (Patient not taking: Reported on 1/31/2025)    valACYclovir (VALTREX) 500 mg tablet Take 1 tablet (500 mg total) by mouth 2 (two) times a day (Patient not taking: Reported on 1/31/2025)     No Known Allergies  Objective   /76 (BP Location: Right arm, Patient Position: Sitting, Cuff Size: Standard)   Pulse 84   Temp (!) 97.3 °F (36.3 °C) (Tympanic)   Resp 20   Ht 5' 11\" (1.803 m)   Wt 99.8 kg (220 lb)   SpO2 100%   BMI 30.68 kg/m²     Physical Exam  Constitutional:       General: He is not in acute distress.     Appearance: Normal appearance. He is normal weight. He is not ill-appearing, toxic-appearing or diaphoretic.   Cardiovascular:      Rate and Rhythm: Normal rate and regular rhythm.      Pulses: Normal pulses.      Heart sounds: Normal heart sounds. No murmur heard.     No gallop.   Pulmonary:      Effort: Pulmonary effort is normal. No respiratory distress.      Breath sounds: Normal breath sounds. No stridor. No wheezing, rhonchi or rales.   Chest:      Chest wall: No tenderness.   Musculoskeletal:      Right lower leg: Edema present.      Left lower leg: No edema.      Comments: Right ankle in boot with Ace wrap   Neurological:      Mental Status: He is alert and oriented to person, place, and time.           Karen Dawson MD  "

## 2025-02-03 ENCOUNTER — ANESTHESIA EVENT (OUTPATIENT)
Dept: PERIOP | Facility: AMBULARY SURGERY CENTER | Age: 40
End: 2025-02-03
Payer: OTHER MISCELLANEOUS

## 2025-02-03 NOTE — PRE-PROCEDURE INSTRUCTIONS
Pre-Surgery Instructions:   Medication Instructions    acetaminophen (TYLENOL) 500 mg tablet Uses PRN- OK to take day of surgery    oxyCODONE-acetaminophen (Percocet) 5-325 mg per tablet Uses PRN- OK to take day of surgery    valACYclovir (VALTREX) 500 mg tablet Uses PRN- OK to take day of surgery      Pt has crutches    Medication instructions for day surgery reviewed. Please use only a sip of water to take your instructed medications. Avoid all over the counter vitamins, supplements and NSAIDS for one week prior to surgery per anesthesia guidelines. Tylenol is ok to take as needed.     You will receive a call one business day prior to surgery with an arrival time and hospital directions. If your surgery is scheduled on a Monday, the hospital will be calling you on the Friday prior to your surgery. If you have not heard from anyone by 8pm, please call the hospital supervisor through the hospital  at [phone]. ([place] [phone] or [place] [phone]).    Do not eat or drink anything after midnight the night before your surgery, including candy, mints, lifesavers, or chewing gum. Do not drink alcohol 24hrs before your surgery. Try not to smoke at least 24hrs before your surgery.       Follow the pre surgery showering instructions as listed in the “My Surgical Experience Booklet” or otherwise provided by your surgeon's office. Do not use a blade to shave the surgical area 1 week before surgery. It is okay to use a clean electric clippers up to 24 hours before surgery. Do not apply any lotions, creams, including makeup, cologne, deodorant, or perfumes after showering on the day of your surgery. Do not use dry shampoo, hair spray, hair gel, or any type of hair products.     No contact lenses, eye make-up, or artificial eyelashes. Remove nail polish, including gel polish, and any artificial, gel, or acrylic nails if possible. Remove all jewelry including rings and body piercing jewelry.     Wear  causal clothing that is easy to take on and off. Consider your type of surgery.    Keep any valuables, jewelry, piercings at home. Please bring any specially ordered equipment (sling, braces) if indicated.    Arrange for a responsible person to drive you to and from the hospital on the day of your surgery. Please confirm the visitor policy for the day of your procedure when you receive your phone call with an arrival time.     Call the surgeon's office with any new illnesses, exposures, or additional questions prior to surgery.    Please reference your “My Surgical Experience Booklet” for additional information to prepare for your upcoming surgery.

## 2025-02-05 NOTE — DISCHARGE INSTR - AVS FIRST PAGE
Dr. Vitro Gunderson, DPM  [facility] Podiatry  Post-Operative Instructions    1. Take your prescribed pain medication as directed. You can take ibuprofen in between doses of the narcotic if breakthrough pain occurs.  2. Upon arrival at home, lie down and elevate your surgical foot on two pillows. Unless you're moving from the couch, bed, or bathroom, make sure to elevate the foot to limit swelling.  3. Remain off your feet as much as possible for the first 24-48 hours. This is when your feet first swell and may become painful. Please follow the restrictions below    - Nonweightbearing to surgical foot (RIGHT foot). Use crutches for assistance    4. Drink large quantities of water. Please avoid alcohol. Continue a well-balanced diet.  5. Report any unusual discomfort or fever to this office.  6. A limited amount of discomfort and swelling is to be expected. In some cases the skin may take on a bruised appearance. The surgical solution that was applied to your foot prior to the operation is dark in color and the operation site may appear to be oozing when it actually is not.  7. A slight amount of blood is to be expected, and is no cause for alarm. Do not remove the dressings. If there is active bleeding and if the bleeding persists, add additional gauze to the bandage, apply direct pressure, elevate your feet and call the office.  8. Do not get the dressings wet. As regular bathing may be inconvenient, sponge baths are recommended. If you shower, keep the dressing dry.   9. When anesthesia wears off and if any discomfort should be present, apply an ice pack directly over the operated area for 15 minute intervals for several hours or until the pain leaves. (USE IN EXCESS OF 15 MINUTES COULD CAUSE FROSTBITE). Do not use hot water bags or electric pads. A convenient icepack can be made by placing ice cubes in a plastic bag and covering this with a towel.  10. If necessary, take a mild laxative before retiring.  11. Having  performed the operation, we are interested in a prompt recovery. Please cooperate by following the above instructions.  12. Please call to confirm your post-op appointment within one week of surgery. Please call the office with any other questions.

## 2025-02-06 ENCOUNTER — HOSPITAL ENCOUNTER (OUTPATIENT)
Facility: AMBULARY SURGERY CENTER | Age: 40
Setting detail: OUTPATIENT SURGERY
Discharge: HOME | End: 2025-02-06
Attending: PODIATRIST | Admitting: PODIATRIST
Payer: OTHER MISCELLANEOUS

## 2025-02-06 ENCOUNTER — ANESTHESIA (OUTPATIENT)
Dept: PERIOP | Facility: AMBULARY SURGERY CENTER | Age: 40
End: 2025-02-06
Payer: OTHER MISCELLANEOUS

## 2025-02-06 ENCOUNTER — APPOINTMENT (OUTPATIENT)
Dept: RADIOLOGY | Facility: AMBULARY SURGERY CENTER | Age: 40
End: 2025-02-06
Payer: OTHER MISCELLANEOUS

## 2025-02-06 VITALS
WEIGHT: 220 LBS | RESPIRATION RATE: 18 BRPM | SYSTOLIC BLOOD PRESSURE: 116 MMHG | HEART RATE: 84 BPM | DIASTOLIC BLOOD PRESSURE: 75 MMHG | TEMPERATURE: 98 F | OXYGEN SATURATION: 98 % | BODY MASS INDEX: 30.8 KG/M2 | HEIGHT: 71 IN

## 2025-02-06 DIAGNOSIS — S82.841A CLOSED BIMALLEOLAR FRACTURE OF RIGHT ANKLE, INITIAL ENCOUNTER: Primary | ICD-10-CM

## 2025-02-06 PROCEDURE — C1713 ANCHOR/SCREW BN/BN,TIS/BN: HCPCS | Performed by: PODIATRIST

## 2025-02-06 PROCEDURE — 77071 MNL APPL STRS JT RADIOGRAPHY: CPT | Performed by: PODIATRIST

## 2025-02-06 PROCEDURE — 73600 X-RAY EXAM OF ANKLE: CPT

## 2025-02-06 PROCEDURE — 99024 POSTOP FOLLOW-UP VISIT: CPT | Performed by: PODIATRIST

## 2025-02-06 PROCEDURE — 27814 TREATMENT OF ANKLE FRACTURE: CPT | Performed by: PODIATRIST

## 2025-02-06 PROCEDURE — C1769 GUIDE WIRE: HCPCS | Performed by: PODIATRIST

## 2025-02-06 DEVICE — 3.5MM CORTEX SCREW SELF-TAPPING 14MM: Type: IMPLANTABLE DEVICE | Site: ANKLE | Status: FUNCTIONAL

## 2025-02-06 DEVICE — 4.0MM CANCELLOUS BONE SCREW FULLY THREADED/16MM: Type: IMPLANTABLE DEVICE | Site: ANKLE | Status: FUNCTIONAL

## 2025-02-06 DEVICE — LCP ONE-THIRD TUBULAR PLATE WITH COLLAR 6 HOLES/69MM
Type: IMPLANTABLE DEVICE | Site: ANKLE | Status: FUNCTIONAL
Brand: LCP

## 2025-02-06 DEVICE — 4.0MM CANNULATED SCREW LONG THREAD/18MM: Type: IMPLANTABLE DEVICE | Site: ANKLE | Status: FUNCTIONAL

## 2025-02-06 DEVICE — 3.5MM CORTEX SCREW SELF-TAPPING 12MM: Type: IMPLANTABLE DEVICE | Site: ANKLE | Status: FUNCTIONAL

## 2025-02-06 DEVICE — 4.0MM CANNULATED SCREW SHORT THREAD/18MM: Type: IMPLANTABLE DEVICE | Site: ANKLE | Status: FUNCTIONAL

## 2025-02-06 RX ORDER — CHLORHEXIDINE GLUCONATE 40 MG/ML
SOLUTION TOPICAL DAILY PRN
Status: DISCONTINUED | OUTPATIENT
Start: 2025-02-06 | End: 2025-02-06 | Stop reason: HOSPADM

## 2025-02-06 RX ORDER — OXYCODONE AND ACETAMINOPHEN 5; 325 MG/1; MG/1
1 TABLET ORAL EVERY 6 HOURS PRN
Qty: 20 TABLET | Refills: 0 | Status: SHIPPED | OUTPATIENT
Start: 2025-02-06 | End: 2025-02-12

## 2025-02-06 RX ORDER — ACETAMINOPHEN 10 MG/ML
1000 INJECTION, SOLUTION INTRAVENOUS ONCE
Status: COMPLETED | OUTPATIENT
Start: 2025-02-06 | End: 2025-02-06

## 2025-02-06 RX ORDER — ACETAMINOPHEN 325 MG/1
650 TABLET ORAL EVERY 4 HOURS PRN
Status: DISCONTINUED | OUTPATIENT
Start: 2025-02-06 | End: 2025-02-06 | Stop reason: HOSPADM

## 2025-02-06 RX ORDER — HYDROMORPHONE HCL/PF 1 MG/ML
0.5 SYRINGE (ML) INJECTION
Status: COMPLETED | OUTPATIENT
Start: 2025-02-06 | End: 2025-02-06

## 2025-02-06 RX ORDER — FENTANYL CITRATE 50 UG/ML
INJECTION, SOLUTION INTRAMUSCULAR; INTRAVENOUS AS NEEDED
Status: DISCONTINUED | OUTPATIENT
Start: 2025-02-06 | End: 2025-02-06

## 2025-02-06 RX ORDER — MIDAZOLAM HYDROCHLORIDE 2 MG/2ML
INJECTION, SOLUTION INTRAMUSCULAR; INTRAVENOUS AS NEEDED
Status: DISCONTINUED | OUTPATIENT
Start: 2025-02-06 | End: 2025-02-06

## 2025-02-06 RX ORDER — CHLORHEXIDINE GLUCONATE ORAL RINSE 1.2 MG/ML
15 SOLUTION DENTAL ONCE
Status: COMPLETED | OUTPATIENT
Start: 2025-02-06 | End: 2025-02-06

## 2025-02-06 RX ORDER — OXYCODONE HYDROCHLORIDE 5 MG/1
5 TABLET ORAL EVERY 4 HOURS PRN
Refills: 0 | Status: COMPLETED | OUTPATIENT
Start: 2025-02-06 | End: 2025-02-06

## 2025-02-06 RX ORDER — CEFAZOLIN SODIUM 2 G/50ML
2000 SOLUTION INTRAVENOUS ONCE
Status: COMPLETED | OUTPATIENT
Start: 2025-02-06 | End: 2025-02-06

## 2025-02-06 RX ORDER — ONDANSETRON 2 MG/ML
4 INJECTION INTRAMUSCULAR; INTRAVENOUS ONCE AS NEEDED
Status: COMPLETED | OUTPATIENT
Start: 2025-02-06 | End: 2025-02-06

## 2025-02-06 RX ORDER — SODIUM CHLORIDE, SODIUM LACTATE, POTASSIUM CHLORIDE, CALCIUM CHLORIDE 600; 310; 30; 20 MG/100ML; MG/100ML; MG/100ML; MG/100ML
INJECTION, SOLUTION INTRAVENOUS CONTINUOUS PRN
Status: DISCONTINUED | OUTPATIENT
Start: 2025-02-06 | End: 2025-02-06

## 2025-02-06 RX ORDER — HYDROMORPHONE HCL/PF 1 MG/ML
SYRINGE (ML) INJECTION AS NEEDED
Status: DISCONTINUED | OUTPATIENT
Start: 2025-02-06 | End: 2025-02-06

## 2025-02-06 RX ORDER — FENTANYL CITRATE/PF 50 MCG/ML
25 SYRINGE (ML) INJECTION
Status: COMPLETED | OUTPATIENT
Start: 2025-02-06 | End: 2025-02-06

## 2025-02-06 RX ORDER — LIDOCAINE HYDROCHLORIDE 10 MG/ML
INJECTION, SOLUTION EPIDURAL; INFILTRATION; INTRACAUDAL; PERINEURAL AS NEEDED
Status: DISCONTINUED | OUTPATIENT
Start: 2025-02-06 | End: 2025-02-06

## 2025-02-06 RX ORDER — MAGNESIUM HYDROXIDE 1200 MG/15ML
LIQUID ORAL AS NEEDED
Status: DISCONTINUED | OUTPATIENT
Start: 2025-02-06 | End: 2025-02-06 | Stop reason: HOSPADM

## 2025-02-06 RX ORDER — PROPOFOL 10 MG/ML
INJECTION, EMULSION INTRAVENOUS AS NEEDED
Status: DISCONTINUED | OUTPATIENT
Start: 2025-02-06 | End: 2025-02-06

## 2025-02-06 RX ORDER — OXYCODONE AND ACETAMINOPHEN 5; 325 MG/1; MG/1
1 TABLET ORAL EVERY 4 HOURS PRN
Status: DISCONTINUED | OUTPATIENT
Start: 2025-02-06 | End: 2025-02-06

## 2025-02-06 RX ORDER — ONDANSETRON 2 MG/ML
INJECTION INTRAMUSCULAR; INTRAVENOUS AS NEEDED
Status: DISCONTINUED | OUTPATIENT
Start: 2025-02-06 | End: 2025-02-06

## 2025-02-06 RX ORDER — DEXAMETHASONE SODIUM PHOSPHATE 10 MG/ML
INJECTION, SOLUTION INTRAMUSCULAR; INTRAVENOUS AS NEEDED
Status: DISCONTINUED | OUTPATIENT
Start: 2025-02-06 | End: 2025-02-06

## 2025-02-06 RX ORDER — BUPIVACAINE HYDROCHLORIDE 5 MG/ML
INJECTION, SOLUTION EPIDURAL; INTRACAUDAL
Status: COMPLETED | OUTPATIENT
Start: 2025-02-06 | End: 2025-02-06

## 2025-02-06 RX ADMIN — CEFAZOLIN SODIUM 2000 MG: 2 SOLUTION INTRAVENOUS at 12:32

## 2025-02-06 RX ADMIN — HYDROMORPHONE HYDROCHLORIDE 0.5 MG: 1 INJECTION, SOLUTION INTRAMUSCULAR; INTRAVENOUS; SUBCUTANEOUS at 15:04

## 2025-02-06 RX ADMIN — MIDAZOLAM 1 MG: 1 INJECTION INTRAMUSCULAR; INTRAVENOUS at 12:38

## 2025-02-06 RX ADMIN — MIDAZOLAM 1 MG: 1 INJECTION INTRAMUSCULAR; INTRAVENOUS at 12:15

## 2025-02-06 RX ADMIN — BUPIVACAINE 10 ML: 13.3 INJECTION, SUSPENSION, LIPOSOMAL INFILTRATION at 12:21

## 2025-02-06 RX ADMIN — BUPIVACAINE HYDROCHLORIDE 10 ML: 5 INJECTION, SOLUTION EPIDURAL; INTRACAUDAL; PERINEURAL at 12:20

## 2025-02-06 RX ADMIN — SODIUM CHLORIDE, SODIUM LACTATE, POTASSIUM CHLORIDE, AND CALCIUM CHLORIDE: .6; .31; .03; .02 INJECTION, SOLUTION INTRAVENOUS at 12:34

## 2025-02-06 RX ADMIN — CHLORHEXIDINE GLUCONATE 15 ML: 1.2 SOLUTION ORAL at 11:50

## 2025-02-06 RX ADMIN — FENTANYL CITRATE 25 MCG: 50 INJECTION INTRAMUSCULAR; INTRAVENOUS at 15:02

## 2025-02-06 RX ADMIN — HYDROMORPHONE HYDROCHLORIDE 0.5 MG: 1 INJECTION, SOLUTION INTRAMUSCULAR; INTRAVENOUS; SUBCUTANEOUS at 15:24

## 2025-02-06 RX ADMIN — FENTANYL CITRATE 50 MCG: 50 INJECTION INTRAMUSCULAR; INTRAVENOUS at 12:17

## 2025-02-06 RX ADMIN — ACETAMINOPHEN 1000 MG: 10 INJECTION INTRAVENOUS at 15:09

## 2025-02-06 RX ADMIN — DEXAMETHASONE SODIUM PHOSPHATE 10 MG: 10 INJECTION INTRAMUSCULAR; INTRAVENOUS at 12:39

## 2025-02-06 RX ADMIN — FENTANYL CITRATE 50 MCG: 50 INJECTION INTRAMUSCULAR; INTRAVENOUS at 12:15

## 2025-02-06 RX ADMIN — FENTANYL CITRATE 25 MCG: 50 INJECTION INTRAMUSCULAR; INTRAVENOUS at 14:52

## 2025-02-06 RX ADMIN — FENTANYL CITRATE 25 MCG: 50 INJECTION INTRAMUSCULAR; INTRAVENOUS at 14:57

## 2025-02-06 RX ADMIN — LIDOCAINE HYDROCHLORIDE 50 MG: 10 INJECTION, SOLUTION EPIDURAL; INFILTRATION; INTRACAUDAL at 12:39

## 2025-02-06 RX ADMIN — ONDANSETRON 4 MG: 2 INJECTION INTRAMUSCULAR; INTRAVENOUS at 12:39

## 2025-02-06 RX ADMIN — OXYCODONE HYDROCHLORIDE 5 MG: 5 TABLET ORAL at 16:07

## 2025-02-06 RX ADMIN — HYDROMORPHONE HYDROCHLORIDE 0.5 MG: 1 INJECTION, SOLUTION INTRAMUSCULAR; INTRAVENOUS; SUBCUTANEOUS at 13:16

## 2025-02-06 RX ADMIN — HYDROMORPHONE HYDROCHLORIDE 0.5 MG: 1 INJECTION, SOLUTION INTRAMUSCULAR; INTRAVENOUS; SUBCUTANEOUS at 14:11

## 2025-02-06 RX ADMIN — PROPOFOL 280 MG: 10 INJECTION, EMULSION INTRAVENOUS at 12:39

## 2025-02-06 RX ADMIN — FENTANYL CITRATE 25 MCG: 50 INJECTION INTRAMUSCULAR; INTRAVENOUS at 15:07

## 2025-02-06 RX ADMIN — ONDANSETRON 4 MG: 2 INJECTION INTRAMUSCULAR; INTRAVENOUS at 17:20

## 2025-02-06 RX ADMIN — SODIUM CHLORIDE, SODIUM LACTATE, POTASSIUM CHLORIDE, AND CALCIUM CHLORIDE: .6; .31; .03; .02 INJECTION, SOLUTION INTRAVENOUS at 13:35

## 2025-02-06 NOTE — DISCHARGE SUMMARY
Discharge Summary Outpatient Procedure Podiatry -   Matthew Simeon 39 y.o. male MRN: [id number]  Unit/Bed#: OR POOL Encounter: [id number]    Admission Date: 2/6/2025     Admitting Diagnosis: Bimalleolar fracture, right, closed, initial encounter [S82.841A]    Discharge Diagnosis: same    Procedures Performed:   Right ankle open reduction with internal fixation of bimalleolar fracture: 42104 (CPT®), 26434 (CPT®)  Stress evaluation of syndesmosis    Complications: none    Condition at Discharge: stable    Discharge instructions/Information to patient and family:   See after visit summary for information provided to patient and family.      Provisions for Follow-Up Care/Important appointments:  See after visit summary for information related to follow-up care and any pertinent home health orders.      Discharge Medications:  See after visit summary for reconciled discharge medications provided to patient and family.

## 2025-02-06 NOTE — ANESTHESIA POSTPROCEDURE EVALUATION
Post-Op Assessment Note            No anethesia notable event occurred.    Staff: Anesthesiologist           Last Filed PACU Vitals:  Vitals Value Taken Time   Temp 97.6 °F (36.4 °C) 02/06/25 1534   Pulse 81 02/06/25 1534   /68 02/06/25 1534   Resp 15 02/06/25 1534   SpO2 97 % 02/06/25 1534       Modified Ej:     Vitals Value Taken Time   Activity 2 02/06/25 1534   Respiration 2 02/06/25 1534   Circulation 2 02/06/25 1534   Consciousness 2 02/06/25 1534   Oxygen Saturation 1 02/06/25 1534     Modified Ej Score: 9

## 2025-02-06 NOTE — ANESTHESIA PROCEDURE NOTES
Peripheral Block    Patient location during procedure: holding area  Start time: 2/6/2025 12:20 PM  Reason for block: at surgeon's request and post-op pain management  Staffing  Performed by: Dwain Costello MD  Authorized by: Dwain Costello MD    Preanesthetic Checklist  Completed: patient identified, IV checked, site marked, risks and benefits discussed, surgical consent, monitors and equipment checked, pre-op evaluation and timeout performed  Peripheral Block  Prep: ChloraPrep  Patient monitoring: frequent blood pressure checks, continuous pulse oximetry and heart rate  Block type: Popliteal  Laterality: right  Injection technique: single-shot  Procedures: ultrasound guided, Ultrasound guidance required for the procedure to increase accuracy and safety of medication placement and decrease risk of complications.  Ultrasound permanent image saved  bupivacaine (PF) (MARCAINE) 0.5 % injection 20 mL - Perineural   10 mL - 2/6/2025 12:20:00 PM  bupivacaine liposomal (EXPAREL) 1.3 % injection 20 mL - Perineural   10 mL - 2/6/2025 12:21:00 PM  Needle  Needle type: Stimuplex   Needle gauge: 20 G  Needle length: 6 in  Needle localization: anatomical landmarks and ultrasound guidance  Needle insertion depth: 5 cm  Assessment  Injection assessment: incremental injection, frequent aspiration, injected with ease, negative aspiration, negative for heart rate change, no paresthesia on injection, no symptoms of intraneural/intravenous injection and needle tip visualized at all times  Paresthesia pain: none  Post-procedure:  site cleaned  patient tolerated the procedure well with no immediate complications

## 2025-02-06 NOTE — ANESTHESIA POSTPROCEDURE EVALUATION
Post-Op Assessment Note    CV Status:  Stable  Pain Score: 0    Pain management: adequate       Mental Status:  Awake and alert   Hydration Status:  Stable   PONV Controlled:  None   Airway Patency:  Patent and adequate     Post Op Vitals Reviewed: Yes    No anethesia notable event occurred.    Staff: CRNA, Anesthesiologist           Last Filed PACU Vitals:  Vitals Value Taken Time   Temp 98.8    Pulse 90    /89    Resp 16    SpO2 99

## 2025-02-06 NOTE — ANESTHESIA PREPROCEDURE EVALUATION
Procedure:  Right ankle open reduction with internal fixation of bimalleolar fracture (Right: Ankle)    Relevant Problems   CARDIO   (+) Mixed hyperlipidemia      NEURO/PSYCH   (+) Major depressive disorder with single episode, in remission (HCC)        Physical Exam    Airway    Mallampati score: II  TM Distance: >3 FB  Neck ROM: full     Dental   No notable dental hx     Cardiovascular  Rhythm: regular, Rate: normal    Pulmonary   Breath sounds clear to auscultation    Other Findings        Anesthesia Plan  ASA Score- 2     Anesthesia Type- general with ASA Monitors.         Additional Monitors:     Airway Plan: ETT.           Plan Factors-Exercise tolerance (METS): >4 METS.    Chart reviewed.   Existing labs reviewed. Patient summary reviewed.    Patient is not a current smoker.      Obstructive sleep apnea risk education given perioperatively.        Induction- intravenous.    Postoperative Plan-     Perioperative Resuscitation Plan - Level 1 - Full Code.       Informed Consent- Anesthetic plan and risks discussed with patient.  I personally reviewed this patient with the CRNA. Discussed and agreed on the Anesthesia Plan with the CRNA..      NPO Status:  No vitals data found for the desired time range.

## 2025-02-06 NOTE — OP NOTE
OPERATIVE REPORT - Podiatry  PATIENT NAME: Matthew Simeon    :  1985  MRN: [id number]  Pt Location: [facility] OR ROOM 05    SURGERY DATE: 2025    Surgeons and Role:     * Vitor Gunderson DPM - Primary     * Odalys Orourke DPM - Assisting    Pre-op Diagnosis:  Bimalleolar fracture, right, closed, initial encounter [S82.841A]    Post-Op Diagnosis Codes:     * Bimalleolar fracture, right, closed, initial encounter [S82.841A]    Procedure(s) (LRB):  Right ankle open reduction with internal fixation of bimalleolar fracture (Right)  Stress evaluation of syndesmosis (Right)    Specimen(s):  None    Estimated Blood Loss:   Minimal    Drains:  None    Anesthesia Type:   General w/ Popliteal Block    Hemostasis:  Pneumatic thigh tourniquet set at 300 mmHg for 65 mins  Direct compression, electrocautery    Materials:  Implant Name Type Inv. Item Serial No.  Lot No. LRB No. Used Action   SCREW CRTX 3.5 X 14MM SS SLF TAP - [id number]  SCREW CRTX 3.5 X 14MM SS SLF TAP  Synthes  Right 2 Implanted   SCREW CANC 4 X 16MM FLLY THRD - [id number]  SCREW CANC 4 X 16MM FLLY THRD  Synthes  Right 1 Implanted   SCREW CRTX 3.5 X 12MM SLF TAP SS - [id number]  SCREW CRTX 3.5 X 12MM SLF TAP SS  Synthes  Right 1 Implanted   SCREW OCTAVIANO 4 X18MM SHRT THRD - [id number]  SCREW OCTAVIANO 4 X18MM SHRT THRD  Synthes  Right 1 Implanted   SCREW OCTAVIANO 4 X 18MM LNG THRD - [id number]  SCREW OCTAVIANO 4 X 18MM LNG THRD  Synthes  Right 1 Implanted   PLATE LCP 1/3 TUBL 69MM 6HL - [id number]  PLATE LCP 1/3 TUBL 69MM 6HL  Synthes  Right 1 Implanted     2-0 Vicryl  4-0 Monocryl  4-0 Nylon    Injectables:  None    Operative Findings:  Consistent with Diagnosis    Complications:   None    Procedure and Technique:     Under mild sedation, the patient was brought into the operating room and placed on the operating room table in the supine position. Prior to entering the room, anesthesia performed a popliteal block. Then IV sedation was achieved by  anesthesia team and a universal timeout was performed where all parties are in agreement of correct patient, correct procedure and correct site. A pneumatic tourniquet was then placed over the patient's right lower extremity with ample padding. The foot was then prepped and draped in the usual aseptic manner. An esmarch bandage was used to exsangunate the foot and the pneumatic tourniquet was then inflated to 300 mmHg.    Attention was then directed to the lateral aspect of the right ankle.  An approximately 8 cm linear incision was made directly over the distal lateral aspect of the fibula. Care was taken to protect any vital neurovascular structures, cautery was used as needed. Sharp dissection was extended down to the level of bone. Periosteum was reflected off of the fibula utilizing combination of #15 blade and a key elevator. Immediately upon reflecting the periosteum, the oblique fracture of the distal fibula was visualized.  Thereafter, the distal fragment was distracted and hematoma and debris within the fracture line was removed utilizing a curette and rongeur.  It was then irrigated with saline.  The foot was then longitudinally distracted about the ankle and the distal fragment was reduced into anatomic position utilizing a reduction clamp and lobster claw clamp.  Proper position was verified utilizing fluoroscopy. A provisional k-wire was placed for fixation, then a 4 x 18mm short cancellous interfrag screw was then placed perpendicular to the fracture line from proximal anterior to distal posterior utilizing proper AO technique. A secondary k-wire was thrown parallel and just distal to the interfrag screw for a secondary interfrag screw fixation. A 4 x 18 mm long cancellous screw was placed utilizing AO technique. A 1/3 tubular plate was then placed against the lateral aspect of the fibular surface.  Proper length and positioning was confirmed on C-arm with appropriate planes. The plate was first  "fixated using 3.5mm cortical screw in the hole just proximal to the fracture site. The plate was drilled unicortically distally and filled utilizing 4.0 mm cancellous screws. Lastly, the remaining holes in the proximal aspect of the plate were filled utilizing bicortical 3.5 mm cortical screws.  Please refer to implants listed above. Adequate position, length, and reduction of deformity verified on fluoroscopy.     Utilizing a randi rake, a syndesmotic tress test was performed under fluoroscopy and the integrity of the syndesmosis was found to be excellent. No increase in medial gutter space or increased tib-fib space was visualized, fracture reduction remained excellent.    The surgical incision was irrigated with copious amounts of normal sterile saline. The periosteal and capsular structures were reapproximated using 2-0 Vicryl. Subcutaneous closure was obtained utilizing 4-0 Monocryl. The tourniquet was deflated at approximately 65 min and normal hyperemic response was noted to all digits.  Then skin edges were reapproximated and closure was obtained utilizing 4-0 Nylon. The foot was then cleansed and dried. The incision site was dressed with xeroform, gauze. This was then covered with an ABD, and Kerlex. Webril was then applied for adequate padding, and a posterior splint was applied and secured with ACE wrap.    The patient tolerated the procedure and anesthesia well without immediate complications and transferred to PACU with vital signs stable.     Dr. Gunderson was present during the entire procedure and participated in all key aspects.    SIGNATURE: Odalys Orourke DPM  DATE: February 6, 2025  TIME: 2:48 PM    Portions of the record may have been created with voice recognition software. Occasional wrong word or \"sound a like\" substitutions may have occurred due to the inherent limitations of voice recognition software. Read the chart carefully and recognize, using context, where substitutions have occurred.\  "

## 2025-02-06 NOTE — INTERVAL H&P NOTE
H&P reviewed. After examining the patient I find no changes in the patients condition since the H&P had been written.    Vitals:    02/06/25 1148   BP: 125/79   Pulse: 84   Resp: 18   Temp: 98.3 °F (36.8 °C)   SpO2: 97%

## 2025-02-08 ENCOUNTER — NURSE TRIAGE (OUTPATIENT)
Dept: OTHER | Facility: OTHER | Age: 40
End: 2025-02-08

## 2025-02-09 NOTE — TELEPHONE ENCOUNTER
"had a right ankle open reduction with internal fixation on 2/6 with Dr. Gunderson. He notes this morning he started with increased pain that starts in the right ankle and shoots into his toes. he notes the pain is worse when he is elevating the foot. Notes ice, motrin and percocet have given minimal relief. Patient also reports mild numbness.     On call provider notified  Reason for Disposition   [1] SEVERE post-op pain (e.g., excruciating, pain scale 8-10) AND [2] not controlled with pain medications    Answer Assessment - Initial Assessment Questions  1. SYMPTOM: \"What's the main symptom you're concerned about?\" (e.g., pain, fever, vomiting)      Increased pain    2. ONSET: \"When did pain  start?\"      Earlier this morning    3. SURGERY: \"What surgery did you have?\"      Right ankle open reduction with internal fixation of bimalleolar fx    4. DATE of SURGERY: \"When was the surgery?\"       2/6      7. PAIN: \"Is there any pain?\" If Yes, ask: \"How bad is it?\"  (Scale 1-10; or mild, moderate, severe)      Severe    8. FEVER: \"Do you have a fever?\" If Yes, ask: \"What is your temperature, how was it measured, and when did it start?\"      Denies    9. VOMITING: \"Is there any vomiting?\" If Yes, ask: \"How many times?\"      None noted    10. BLEEDING: \"Is there any bleeding?\" If Yes, ask: \"How much?\" and \"Where?\"        None noted    11. OTHER SYMPTOMS: \"Do you have any other symptoms?\" (e.g., drainage from wound, painful urination, constipation)        Mild numbness    Protocols used: Post-Op Symptoms and Questions-Adult-    "

## 2025-02-09 NOTE — TELEPHONE ENCOUNTER
Per on call-  Patient should be seen in ED if pain persists    Patient was advised and verbalized understanding. He notes that when he stops elevating the leg the pain significantly decreases. He will continue to apply ice and elevate as he can. If pain persists or becomes more severe he will proceed to ED for eval.

## 2025-02-09 NOTE — TELEPHONE ENCOUNTER
"Regarding: Post op/shooting searing pain  ----- Message from Kelsi ORTIZ sent at 2/8/2025  8:13 PM EST -----  \"I had surgery on thursday and I am having this shooting searing pain on my leg.\"    "

## 2025-02-12 ENCOUNTER — OFFICE VISIT (OUTPATIENT)
Dept: PODIATRY | Facility: CLINIC | Age: 40
End: 2025-02-12

## 2025-02-12 VITALS — HEIGHT: 71 IN | BODY MASS INDEX: 30.68 KG/M2

## 2025-02-12 DIAGNOSIS — Z87.81 STATUS POST OPEN REDUCTION WITH INTERNAL FIXATION (ORIF) OF FRACTURE OF ANKLE: Primary | ICD-10-CM

## 2025-02-12 DIAGNOSIS — Z98.890 STATUS POST OPEN REDUCTION WITH INTERNAL FIXATION (ORIF) OF FRACTURE OF ANKLE: Primary | ICD-10-CM

## 2025-02-12 PROCEDURE — 99024 POSTOP FOLLOW-UP VISIT: CPT | Performed by: PODIATRIST

## 2025-02-12 RX ORDER — OXYCODONE AND ACETAMINOPHEN 5; 325 MG/1; MG/1
1 TABLET ORAL EVERY 6 HOURS PRN
Qty: 20 TABLET | Refills: 0 | Status: SHIPPED | OUTPATIENT
Start: 2025-02-12 | End: 2025-02-20

## 2025-02-12 NOTE — PROGRESS NOTES
Name: Matthew Simeon      : 1985      MRN: [id number]  Encounter Provider: Vitor Gunderson DPM  Encounter Date: 2025   Encounter department: [facility] PODIATRY [place]    Assessment & Plan     1. Status post open reduction with internal fixation (ORIF) of fracture of ankle  -     oxyCODONE-acetaminophen (Percocet) 5-325 mg per tablet; Take 1 tablet by mouth every 6 (six) hours as needed for moderate pain for up to 30 doses Max Daily Amount: 4 tablets      Sutures were left in place today.    Weightbearing restrictions will remain the same until seen for follow-up and reevaluation.  Plan for return evaluation 1 to 2 weeks for consideration of suture removal.  Patient should not place moisturizing lotions around the surgical incision.  Patient may wash the area with antibacterial soap and water.  I do not recommend soaking of the foot or ankle.    Notify the office immediately of any signs of infection including but not limited to: redness around incision, streaking red line up foot or leg, drainage, increased pain, fever, chills, sweats, nausea, vomiting, shortness of breath and/or chest pain.  If you are concerned for any reason, we have a physician on call 24 hours a day 7 days a week that can answer your questions.  Please call [phone]    Plan for x-rays at the next visit.  Continue weightbearing restrictions.    Subjective      HPI    25- 1.    Right ankle open reduction with internal fixation of bimalleolar fracture (Lateral malleolus fixated, posterior malleolus reduced and was stable small fragment)   2.  Right ankle Stress evaluation of syndesmosis     Patient is doing well his pain is improving from the time he called in over the weekend.  Increasing pain after the block wore off.  States he is taking his pain medication every 4 hours.    Has been able to stay off of his foot.  Review of Systems      Constitutional: Negative for fever.   Respiratory: Negative for shortness  "of breath.    Cardiovascular: Negative for chest pain.  Gastrointestinal: Negative for nausea and vomiting.     Current Outpatient Medications on File Prior to Visit   Medication Sig    acetaminophen (TYLENOL) 500 mg tablet Take 1 tablet (500 mg total) by mouth every 6 (six) hours as needed for mild pain or moderate pain    apixaban (Eliquis) 2.5 mg Take 1 tablet (2.5 mg total) by mouth 2 (two) times a day for 21 days Start the day after surgery    ibuprofen (MOTRIN) 600 mg tablet Take 1 tablet (600 mg total) by mouth every 6 (six) hours as needed for mild pain    [DISCONTINUED] oxyCODONE-acetaminophen (Percocet) 5-325 mg per tablet Take 1 tablet by mouth every 4 (four) hours as needed for moderate pain for up to 40 doses Max Daily Amount: 6 tablets    [DISCONTINUED] oxyCODONE-acetaminophen (Percocet) 5-325 mg per tablet Take 1 tablet by mouth every 6 (six) hours as needed for moderate pain for up to 20 doses Max Daily Amount: 4 tablets    naloxone (NARCAN) 4 mg/0.1 mL nasal spray Administer 1 spray into a nostril. If no response after 2-3 minutes, give another dose in the other nostril using a new spray. (Patient not taking: Reported on 2/12/2025)    valACYclovir (VALTREX) 500 mg tablet Take 1 tablet (500 mg total) by mouth 2 (two) times a day (Patient taking differently: Take 500 mg by mouth 2 (two) times a day as needed)       Objective     Ht 5' 11\" (1.803 m)   BMI 30.68 kg/m²     Physical Exam      Incision site appears well healing, there is some erythema around the incision however no signs of infection noted currently no drainage noted to the area.  There are no signs of necrosis.  There is some expected swelling.  No calf pain or tenderness on palpation.  Neurological status is at baseline.  Vascular status is at baseline.  Intact neurological sensation.  "

## 2025-02-17 DIAGNOSIS — A60.00 HERPES SIMPLEX INFECTION OF GENITOURINARY SYSTEM: ICD-10-CM

## 2025-02-17 RX ORDER — VALACYCLOVIR HYDROCHLORIDE 500 MG/1
500 TABLET, FILM COATED ORAL 2 TIMES DAILY
Qty: 20 TABLET | Refills: 0 | Status: SHIPPED | OUTPATIENT
Start: 2025-02-17 | End: 2025-03-19

## 2025-02-17 NOTE — TELEPHONE ENCOUNTER
Reason for call:   [x] Refill   [] Prior Auth  [] Other:     Office:   [x] PCP/Provider - [facility]   [] Specialty/Provider -     Medication:   ~ valACYclovir (VALTREX) 500 mg tablet - Take 1 tablet (500 mg total) by mouth 2 (two) times a day     Pharmacy:   [id number]/pharmacy #[id number] - [place] [facility] [address]     Does the patient have enough for 3 days?   [] Yes   [x] No - Send as HP to POD

## 2025-02-19 ENCOUNTER — TELEPHONE (OUTPATIENT)
Age: 40
End: 2025-02-19

## 2025-02-19 NOTE — TELEPHONE ENCOUNTER
Caller: Matthew Simeon    Doctor and/or Office: Dr. Gunderson/Suzan    #: [phone]    Escalation: Surgery Patient requesting a refill on his pain meds. He is using the [facility] listed in Epic. Please return call. Thank you

## 2025-02-20 ENCOUNTER — OFFICE VISIT (OUTPATIENT)
Dept: PODIATRY | Facility: CLINIC | Age: 40
End: 2025-02-20

## 2025-02-20 DIAGNOSIS — Z87.81 STATUS POST OPEN REDUCTION WITH INTERNAL FIXATION (ORIF) OF FRACTURE OF ANKLE: Primary | ICD-10-CM

## 2025-02-20 DIAGNOSIS — Z98.890 STATUS POST OPEN REDUCTION WITH INTERNAL FIXATION (ORIF) OF FRACTURE OF ANKLE: Primary | ICD-10-CM

## 2025-02-20 PROCEDURE — 99024 POSTOP FOLLOW-UP VISIT: CPT | Performed by: PODIATRIST

## 2025-02-20 RX ORDER — OXYCODONE AND ACETAMINOPHEN 5; 325 MG/1; MG/1
1 TABLET ORAL EVERY 4 HOURS PRN
Qty: 30 TABLET | Refills: 0 | Status: SHIPPED | OUTPATIENT
Start: 2025-02-20

## 2025-02-20 NOTE — LETTER
February 20, 2025     Patient: Matthew Simeon  YOB: 1985  Date of Visit: 2/20/2025      To Whom it May Concern:    Matthew Simeon is under my professional care. Matthew was seen in my office on 2/20/2025. Matthew may return to work on April 28, 2025 without restrictions .    If you have any questions or concerns, please don't hesitate to call.         Sincerely,          Vitor Gunderson DPM        CC: No Recipients

## 2025-02-20 NOTE — PROGRESS NOTES
Name: Matthew Simeon      : 1985      MRN: [id number]  Encounter Provider: Vitor Gunderson DPM  Encounter Date: 2025   Encounter department: [facility] PODIATRY [place]    Assessment & Plan     1. Status post open reduction with internal fixation (ORIF) of fracture of ankle  -     XR ankle 3+ vw right  -     Cam Boot  -     Ambulatory referral to Physical Therapy; Future  -     oxyCODONE-acetaminophen (Percocet) 5-325 mg per tablet; Take 1 tablet by mouth every 4 (four) hours as needed for moderate pain for up to 30 doses Max Daily Amount: 6 tablets    My personal interpretation today of the x-rays that were taken show X-rays reviewed today show stable appearance of the ankle mortise with hardware in place no significant change in position or alignment noted today.    The patient is doing well at this point.  Removed sutures today.  We removed surgical dressings today and replaced with new dressings.  Continue the same weightbearing restrictions that were given to you after surgery.  Patient should not place moisturizing lotions around the surgical incision.  Patient may  wash the area with antibacterial soap and water.  I do not recommend soaking of the foot or ankle.    Notify the office immediately of any signs of infection including but not limited to: redness around incision, streaking red line up foot or leg, drainage, increased pain, fever, chills, sweats, nausea, vomiting, shortness of breath and/or chest pain.  If you are concerned for any reason, we have a physician on call 24 hours a day 7 days a week that can answer your questions.  Please call [phone]      Subjective      HPI      Review of Systems    Constitutional: Negative for fever.   Respiratory: Negative for shortness of breath.    Cardiovascular: Negative for chest pain.  Gastrointestinal: Negative for nausea and vomiting.     Current Outpatient Medications on File Prior to Visit   Medication Sig   • acetaminophen  (TYLENOL) 500 mg tablet Take 1 tablet (500 mg total) by mouth every 6 (six) hours as needed for mild pain or moderate pain   • apixaban (Eliquis) 2.5 mg Take 1 tablet (2.5 mg total) by mouth 2 (two) times a day for 21 days Start the day after surgery   • ibuprofen (MOTRIN) 600 mg tablet Take 1 tablet (600 mg total) by mouth every 6 (six) hours as needed for mild pain   • naloxone (NARCAN) 4 mg/0.1 mL nasal spray Administer 1 spray into a nostril. If no response after 2-3 minutes, give another dose in the other nostril using a new spray. (Patient not taking: Reported on 2/12/2025)   • valACYclovir (VALTREX) 500 mg tablet Take 1 tablet (500 mg total) by mouth 2 (two) times a day   • [DISCONTINUED] oxyCODONE-acetaminophen (Percocet) 5-325 mg per tablet Take 1 tablet by mouth every 6 (six) hours as needed for moderate pain for up to 30 doses Max Daily Amount: 4 tablets       Objective     There were no vitals taken for this visit.    Physical Exam      Incision site appears well healing.  No abnormal warmth or redness.  There are no signs of necrosis.  There is some expected swelling.  No calf pain or tenderness on palpation.  Neurological status is at baseline.  Vascular status is at baseline.  There is some numbness and tingling noted on the dorsal foot.  Swelling is improved bruising has improved.  Incision site is well-healing at this time.

## 2025-02-21 ENCOUNTER — TELEPHONE (OUTPATIENT)
Age: 40
End: 2025-02-21

## 2025-02-21 NOTE — TELEPHONE ENCOUNTER
Caller: Brielle-Nurse  assistant    Doctor and/or Office: Dr. Gunderson/Suzan    #: [phone]    Escalation: Last office visit Requesting office visit notes to be faxed to [phone].Thank you

## 2025-02-21 NOTE — TELEPHONE ENCOUNTER
Caller: Matthew     Doctor and/or Office: Dr. Gunderson /Philippe     #: [phone]     Escalation: Care Patient called and wanted to know if he needs to start physical therapy next week or is ok to start March 3rd.  The location is closer to his house and he has to Uber there but he will go somewhere else if you def want him to start next week  Please advise thank you

## 2025-03-03 ENCOUNTER — EVALUATION (OUTPATIENT)
Dept: PHYSICAL THERAPY | Facility: CLINIC | Age: 40
End: 2025-03-03
Payer: OTHER MISCELLANEOUS

## 2025-03-03 VITALS — SYSTOLIC BLOOD PRESSURE: 124 MMHG | DIASTOLIC BLOOD PRESSURE: 86 MMHG

## 2025-03-03 DIAGNOSIS — Z87.81 STATUS POST OPEN REDUCTION WITH INTERNAL FIXATION (ORIF) OF FRACTURE OF ANKLE: ICD-10-CM

## 2025-03-03 DIAGNOSIS — Z98.890 STATUS POST OPEN REDUCTION WITH INTERNAL FIXATION (ORIF) OF FRACTURE OF ANKLE: ICD-10-CM

## 2025-03-03 PROCEDURE — 97110 THERAPEUTIC EXERCISES: CPT

## 2025-03-03 PROCEDURE — 97530 THERAPEUTIC ACTIVITIES: CPT

## 2025-03-03 PROCEDURE — 97161 PT EVAL LOW COMPLEX 20 MIN: CPT

## 2025-03-03 PROCEDURE — 97140 MANUAL THERAPY 1/> REGIONS: CPT

## 2025-03-03 NOTE — PROGRESS NOTES
PT Evaluation     Today's date: 3/3/2025  Patient name: Matthew Simeon  : 1985  MRN: [id number]  Referring provider: Vitor Gunderson,*  Dx:   Encounter Diagnosis     ICD-10-CM    1. Status post open reduction with internal fixation (ORIF) of fracture of ankle  Z98.890 Ambulatory referral to Physical Therapy    Z87.81           Start Time: 0806          Assessment  Impairments: abnormal or restricted ROM, impaired balance, impaired physical strength and pain with function  Symptom irritability: moderate    Assessment details: Matthew Simeon  is a pleasant 39 y.o. male who presents with R ankle pain secondary to ORIF on 25.  Demonstrates gross decreased ankle AROM and strength secondary to prolonged immobilization as well as increased atrophy and mobility deficits. Currently ambulates with CAM and crutches secondary to WB restrictions limiting his ability to complete ADLS and IADLS as well as return to work. Provided HEP focused on general mobility to tolerance with emphasis on performing mobility to tolerance.       Problem List:  1) Decreased ankle ROM   2) Decreased ankle strength   3) WB/ ambulation deficits     Tolerated session without adverse effects. Pt. will benefit from skilled PT services that includes manual therapy techniques to enhance tissue extensibility, neuromuscular re-education to facilitate motor control, therapeutic exercise to increase functional mobility, and modalities prn to reduce pain and inflammation.     Access Code: [email or web address]  URL: [email or web address]  Date: 2025  Prepared by: Sasha Vicente    Exercises  - Small Range Straight Leg Raise  - 1 x daily - 7 x weekly - 3 sets - 10 reps  - Sidelying Hip Abduction  - 1 x daily - 7 x weekly - 3 sets - 10 reps  - Prone Hip Extension Sequence  - 1 x daily - 7 x weekly - 3 sets - 10 reps  - Supine Ankle Eversion AROM  - 1 x daily - 7 x weekly - 3 sets - 10 reps  - Supine Ankle Inversion AROM  - 1 x daily - 7  "x weekly - 3 sets - 10 reps  - Supine Ankle Dorsiflexion and Plantarflexion AROM  - 1 x daily - 7 x weekly - 3 sets - 10 reps  - Long Sitting Calf Stretch with Strap  - 1 x daily - 7 x weekly - 3 reps - 30 hold  Understanding of Dx/Px/POC: good     Prognosis: good    Goals  Impairment Goals  - Pt I with initial HEP in 1-2 visits  - Improve ankle  ROM equal to contralateral side in 4-6 weeks  - Increase strength to 5/5 in all affected areas in 4-6 weeks    Functional Goals  - Increase Functional Status Measure to: MDC in 6-8 weeks  - Patient will be independent with comprehensive HEP in 6-8 weeks  - Ambulation is improved to prior level of function in 6-8 weeks  - Stair climbing is improved to prior level of function in 6-8 weeks  - Squatting is improved to prior level of function in 6-8 weeks   - Patient will return to work duties without limitation or pain to facilitate return to work without pain or limitation by d/c     Plan  Patient would benefit from: PT eval and skilled physical therapy  Referral necessary: No  Planned modality interventions: thermotherapy: hydrocollator packs and cryotherapy    Planned therapy interventions: IASTM, joint mobilization, kinesiology taping, manual therapy, nerve gliding, neuromuscular re-education, patient/caregiver education, postural training, strengthening, stretching, therapeutic activities, therapeutic exercise, home exercise program and balance    Frequency: 2x week  Duration in weeks: 8  Treatment plan discussed with: patient      Subjective Evaluation    History of Present Illness  Mechanism of injury: surgery  Mechanism of injury: Matthew Simeon presents with c/c of R pain secondary to ORIF. DOS was 2/6/4. Reports numbness/ edema in the toes that is returning. Reports ankle fx occurred secondary to fall at work on 1/26/24. Currently in a CAM boot with crutches and to \" put weight only on the heel\" as per provider. Not allowed to lift or drive currently. Follow up with " "physician on 3/20. Was immobilized in splint for 10 days prior to surgical repair and then been in boot since surgical repair.   Aggravating factors: dependent pain, periodic pain   Relieving factors: ibuprofen and ice   24hr pain pattern: 6/10 (current), 6/10 (best), 10/10 (worst), location:lateral ankle incision and lateral foot , descriptors: sharp/ burning   Imaging: XR ANKLE 3+ VW RIGHT     INDICATION: Z98.890: Other specified postprocedural states  Z87.81: Personal history of (healed) traumatic fracture.     COMPARISON: Right ankle x-ray 1/27/2025     FINDINGS:     Stable alignment of distal fibular fracture with stable plate and screw fixation hardware.     No significant degenerative changes.     No lytic or blastic osseous lesion.     Unremarkable soft tissues.     IMPRESSION:     Stable alignment of distal fibular fracture status post ORIF.    Previous treatments: none   Occupation/recreation:  at [employer] has to walk 2-3 miles per night, lifting up to 50#   Patient goals: \" build the calf muscle back up\"            Not a recurrent problem   Quality of life: good        Objective     Observations     Right Ankle/Foot   Positive for atrophy, edema and incision.     Additional Observation Details  Lateral ankle incision scabbing no signs of infection educated on signs of infection     Palpation     Additional Palpation Details  TTP in the ant tib and gastroc   Tib pain   Peroneal pain     Neurological Testing     Sensation     Ankle/Foot   Left Ankle/Foot   Intact: light touch    Right Ankle/Foot   Intact: light touch     Comments   Left light touch: ankle.   Right light touch: ankle.     Active Range of Motion   Left Ankle/Foot   Normal active range of motion    Right Ankle/Foot   Dorsiflexion (ke): 3 degrees   Plantar flexion: 10 degrees   Inversion: 0 degrees   Eversion: 5 degrees     Joint Play     Additional Joint Play Details  Did not assess today     Strength/Myotome Testing " "    Left Ankle/Foot   Normal strength    Right Ankle/Foot   Dorsiflexion: 3  Plantar flexion: 3  Inversion: 3-  Eversion: 3-    Swelling   Left Ankle/Foot   Figure 8: 32.5 cm    Right Ankle/Foot   Figure 8: 35 cm    General Comments:      Ankle/Foot Comments   Wears boot 24/7 except to shower       Flowsheet Rows      Flowsheet Row Most Recent Value   PT/OT G-Codes    Current Score 38   Projected Score 65               Precautions: major depressive disorder, s/p ORIF     DOS 2/6/25      Manuals 3/3            PROM to pierce              IASTM             Edema massage  KR                          Neuro Re-Ed 3/3            BAPS board              Towel scrunches                                                                               Ther Ex 3/3            Ankle AROM (HEP) 3x10             SLR 3 ways (HEP) 3x10             Gastroc stretch (HEP) 3x30\"             Hamstring stretch                                                                  Ther Activity 3/3            Bike              Patient education  KR                                       foto             Modalities 3/3            CP prn                                "

## 2025-03-10 ENCOUNTER — OFFICE VISIT (OUTPATIENT)
Dept: PHYSICAL THERAPY | Facility: CLINIC | Age: 40
End: 2025-03-10
Payer: OTHER MISCELLANEOUS

## 2025-03-10 DIAGNOSIS — Z87.81 STATUS POST OPEN REDUCTION WITH INTERNAL FIXATION (ORIF) OF FRACTURE OF ANKLE: Primary | ICD-10-CM

## 2025-03-10 DIAGNOSIS — Z98.890 STATUS POST OPEN REDUCTION WITH INTERNAL FIXATION (ORIF) OF FRACTURE OF ANKLE: Primary | ICD-10-CM

## 2025-03-10 PROCEDURE — 97112 NEUROMUSCULAR REEDUCATION: CPT | Performed by: PHYSICAL THERAPIST

## 2025-03-10 PROCEDURE — 97110 THERAPEUTIC EXERCISES: CPT | Performed by: PHYSICAL THERAPIST

## 2025-03-10 PROCEDURE — 97530 THERAPEUTIC ACTIVITIES: CPT | Performed by: PHYSICAL THERAPIST

## 2025-03-10 NOTE — PROGRESS NOTES
"Daily Note     Today's date: 3/10/2025  Patient name: Matthew Simeon  : 1985  MRN: [id number]  Referring provider: Vitor Gunderson,*  Dx:   Encounter Diagnosis     ICD-10-CM    1. Status post open reduction with internal fixation (ORIF) of fracture of ankle  Z98.890     Z87.81           Start Time: 0756  Stop Time: 0855  Total time in clinic (min): 59 minutes    Subjective: Patient reports ankle swelling and stiffness. Patient reports icing and using figure 8 wrap.      Objective: See treatment diary below      Assessment: Patient presents to session ambulating with b/l crutches maintaining heel WB precautions. Patient started towel scrunches and experienced some posterior ankle pulling with appropriate challenge. Patient started prone HS curls and LAQ and experienced pulling of rec fem and popliteal fossa area respectively, no pain throughout session. Patient education provided on elevating ankle in addition to ice and compression to reduce swelling. Patient asked about using icy-hot, discussed potential to reduce soreness but being mindful of side-effects. Discussed wrapping ace wrap continuously with most pressure distally to reduce swelling. Patient demonstrated fatigue post treatment, exhibited good technique with therapeutic exercises, and would benefit from continued PT.      Plan: Continue per plan of care.  Progress treatment as tolerated.       Precautions: major depressive disorder, s/p ORIF     DOS 25      Date 3/3 3/10           Visit # IE 2           FOTO IE             Re-eval IE                 Manuals 3/3 3/10           PROM to pierce   Gentle inv/ev           IASTM             Edema massage  KR             HS str              Neuro Re-Ed 3/3 3/10           BAPS board              Towel scrunches   2x20           HS curl  3x10            LAQ  3x10 3\"                                                  Ther Ex 3/3 3/10           Ankle AROM (HEP) 3x10  20x ea           SLR 3 ways (HEP) " "3x10  3x10 ea           Gastroc stretch (HEP) 3x30\"  3x30\"           Hamstring stretch                                                                  Ther Activity 3/3 3/10           Bike              Patient education  KR  ND                                     foto             Modalities 3/3 3/10           CP prn                                "

## 2025-03-12 ENCOUNTER — OFFICE VISIT (OUTPATIENT)
Dept: PHYSICAL THERAPY | Facility: CLINIC | Age: 40
End: 2025-03-12
Payer: OTHER MISCELLANEOUS

## 2025-03-12 DIAGNOSIS — Z87.81 STATUS POST OPEN REDUCTION WITH INTERNAL FIXATION (ORIF) OF FRACTURE OF ANKLE: Primary | ICD-10-CM

## 2025-03-12 DIAGNOSIS — Z98.890 STATUS POST OPEN REDUCTION WITH INTERNAL FIXATION (ORIF) OF FRACTURE OF ANKLE: Primary | ICD-10-CM

## 2025-03-12 PROCEDURE — 97110 THERAPEUTIC EXERCISES: CPT

## 2025-03-12 PROCEDURE — 97140 MANUAL THERAPY 1/> REGIONS: CPT

## 2025-03-12 NOTE — PROGRESS NOTES
"Daily Note     Today's date: 3/12/2025  Patient name: Matthew Simeon  : 1985  MRN: [id number]  Referring provider: Vitor Gunderson,*  Dx:   No diagnosis found.                 Subjective: Patient states he has some concerns with the swelling that is still present in his foot and ankle.       Objective: See treatment diary below      Assessment: Tolerated session fair. Soreness and swelling persists. No other increased symptoms noted. Reviewed HEP as well and shows good compliance. Wait for doctor follow up next week before progressing.       Plan: Continue per plan of care.  Progress treatment as tolerated.       Precautions: major depressive disorder, s/p ORIF     DOS 25      Date 3/3 3/10 3/12          Visit # IE 2 3          FOTO IE             Re-eval IE                 Manuals 3/3 3/10 3/12          PROM to pierce   Gentle inv/ev GF           IASTM             Edema massage  KR             HS str    30\" x 3           Neuro Re-Ed 3/3 3/10 3/12          BAPS board              Towel scrunches   2x20 2 x 20           HS curl  3x10  3 x 10           LAQ  3x10 3\" 3\" 3 x 10                                                  Ther Ex 3/3 3/10 3/12          Ankle AROM (HEP) 3x10  20x ea X 30 ea          SLR 3 ways (HEP) 3x10  3x10 ea 3 x 10 ea          Gastroc stretch (HEP) 3x30\"  3x30\" 30\" x 3           Hamstring stretch    30\" x 3                                                               Ther Activity 3/3 3/10 3/12          Bike              Patient education  KR  ND GF                                    foto             Modalities 3/3 3/10 3/12          CP prn                                "

## 2025-03-17 ENCOUNTER — APPOINTMENT (OUTPATIENT)
Dept: PHYSICAL THERAPY | Facility: CLINIC | Age: 40
End: 2025-03-17
Payer: OTHER MISCELLANEOUS

## 2025-03-19 ENCOUNTER — APPOINTMENT (OUTPATIENT)
Dept: PHYSICAL THERAPY | Facility: CLINIC | Age: 40
End: 2025-03-19
Payer: OTHER MISCELLANEOUS

## 2025-03-20 ENCOUNTER — OFFICE VISIT (OUTPATIENT)
Dept: PODIATRY | Facility: CLINIC | Age: 40
End: 2025-03-20

## 2025-03-20 VITALS — WEIGHT: 212.4 LBS | HEIGHT: 71 IN | BODY MASS INDEX: 29.73 KG/M2

## 2025-03-20 DIAGNOSIS — Z98.890 STATUS POST OPEN REDUCTION WITH INTERNAL FIXATION (ORIF) OF FRACTURE OF ANKLE: Primary | ICD-10-CM

## 2025-03-20 DIAGNOSIS — Z87.81 STATUS POST OPEN REDUCTION WITH INTERNAL FIXATION (ORIF) OF FRACTURE OF ANKLE: Primary | ICD-10-CM

## 2025-03-20 PROCEDURE — 99024 POSTOP FOLLOW-UP VISIT: CPT | Performed by: PODIATRIST

## 2025-03-20 RX ORDER — MELOXICAM 7.5 MG/1
7.5 TABLET ORAL DAILY
Qty: 50 TABLET | Refills: 0 | Status: SHIPPED | OUTPATIENT
Start: 2025-03-20 | End: 2025-05-09

## 2025-03-20 NOTE — PROGRESS NOTES
Name: Matthew Simeon      : 1985      MRN: [id number]  Encounter Provider: Vitor Gunderson DPM  Encounter Date: 3/20/2025   Encounter department: [facility] POD[facility] [place]    Assessment & Plan     1. Status post open reduction with internal fixation (ORIF) of fracture of ankle  -     XR ankle 3+ vw right  -     meloxicam (Mobic) 7.5 mg tablet; Take 1 tablet (7.5 mg total) by mouth daily for 50 doses      My personal interpretation today of the x-rays that were taken show X-rays reviewed today show stable appearance of the ankle mortise with improvement in healing of the distal fibular fracture no significant change in position or alignment noted today on examination.  Posterior malleoli are piece avulsion appears healed as well.          Patient is doing well at this point.  His pain is minimal.  Will have him continue with physical therapy.    Will recheck in 2 months to see how he is doing at that time.    I did discuss with him compression stockings in addition I will give him an anti-inflammatory medication he can supplement with Tylenol as well.        Subjective      HPI    25- 1.    Right ankle open reduction with internal fixation of bimalleolar fracture (Lateral malleolus fixated, posterior malleolus reduced and was stable small fragment)   2.  Right ankle Stress evaluation of syndesmosis     Patient doing well at this point.  Is doing physical therapy.  Using crutches and boot.  Doing well at this point.  Denies any significant issues.  Review of Systems      Constitutional: Negative for fever.   Respiratory: Negative for shortness of breath.    Cardiovascular: Negative for chest pain.  Gastrointestinal: Negative for nausea and vomiting.     Current Outpatient Medications on File Prior to Visit   Medication Sig   • acetaminophen (TYLENOL) 500 mg tablet Take 1 tablet (500 mg total) by mouth every 6 (six) hours as needed for mild pain or moderate pain   • ibuprofen (MOTRIN) 600 mg  "tablet Take 1 tablet (600 mg total) by mouth every 6 (six) hours as needed for mild pain   • oxyCODONE-acetaminophen (Percocet) 5-325 mg per tablet Take 1 tablet by mouth every 4 (four) hours as needed for moderate pain for up to 30 doses Max Daily Amount: 6 tablets   • apixaban (Eliquis) 2.5 mg Take 1 tablet (2.5 mg total) by mouth 2 (two) times a day for 21 days Start the day after surgery   • naloxone (NARCAN) 4 mg/0.1 mL nasal spray Administer 1 spray into a nostril. If no response after 2-3 minutes, give another dose in the other nostril using a new spray. (Patient not taking: Reported on 2/12/2025)   • valACYclovir (VALTREX) 500 mg tablet Take 1 tablet (500 mg total) by mouth 2 (two) times a day       Objective     Ht 5' 11\" (1.803 m)   Wt 96.3 kg (212 lb 6.4 oz)   BMI 29.62 kg/m²     Physical Exam      Incision site appears well healed, without any warmth or redness.  There are no signs of necrosis.  There is some expected swelling.  No calf pain or tenderness on palpation.  Neurological status is at baseline.  Vascular status is at baseline.    Range of motion is improved there is some mild limitation of dorsiflexion noted at the level of the ankle joint.  No tenderness with palpation at the level of the incision site.  No other areas of discomfort or tenderness noted on examination.    "

## 2025-03-24 ENCOUNTER — OFFICE VISIT (OUTPATIENT)
Dept: PHYSICAL THERAPY | Facility: CLINIC | Age: 40
End: 2025-03-24
Payer: OTHER MISCELLANEOUS

## 2025-03-24 ENCOUNTER — TELEPHONE (OUTPATIENT)
Age: 40
End: 2025-03-24

## 2025-03-24 DIAGNOSIS — Z87.81 STATUS POST OPEN REDUCTION WITH INTERNAL FIXATION (ORIF) OF FRACTURE OF ANKLE: Primary | ICD-10-CM

## 2025-03-24 DIAGNOSIS — Z98.890 STATUS POST OPEN REDUCTION WITH INTERNAL FIXATION (ORIF) OF FRACTURE OF ANKLE: Primary | ICD-10-CM

## 2025-03-24 PROCEDURE — 97530 THERAPEUTIC ACTIVITIES: CPT

## 2025-03-24 PROCEDURE — 97110 THERAPEUTIC EXERCISES: CPT

## 2025-03-24 PROCEDURE — 97112 NEUROMUSCULAR REEDUCATION: CPT

## 2025-03-24 NOTE — TELEPHONE ENCOUNTER
Once he is in supportive shoe gear and out of the boot, he can transition to driving.    It sometimes can take a couple weeks to strengthen the ankle and foot prior to being able to get into a shoe and depending on the swelling.

## 2025-03-24 NOTE — TELEPHONE ENCOUNTER
Caller: Mariaelena    Doctor/Office: Dr Gunderson    CB#: [phone]      What needs to be faxed: OV note from 3/20/25    ATTN to: David    Fax#: [phone]      Documents were successfully e-faxed

## 2025-03-24 NOTE — TELEPHONE ENCOUNTER
Caller: Matthew Simeon    Doctor and/or Office: Dr. Gunderson/Suzan    #: [phone]    Escalation: Surgery Patient would like to know if he is cleared for driving? Please return call. Thank you

## 2025-03-25 NOTE — TELEPHONE ENCOUNTER
I spoke with pt and relayed the message, he verbalized understanding and stated he is working with PT and they will be trying to get him in a shoe this week.

## 2025-03-26 ENCOUNTER — OFFICE VISIT (OUTPATIENT)
Dept: PHYSICAL THERAPY | Facility: CLINIC | Age: 40
End: 2025-03-26
Payer: OTHER MISCELLANEOUS

## 2025-03-26 DIAGNOSIS — Z98.890 STATUS POST OPEN REDUCTION WITH INTERNAL FIXATION (ORIF) OF FRACTURE OF ANKLE: Primary | ICD-10-CM

## 2025-03-26 DIAGNOSIS — Z87.81 STATUS POST OPEN REDUCTION WITH INTERNAL FIXATION (ORIF) OF FRACTURE OF ANKLE: Primary | ICD-10-CM

## 2025-03-26 PROCEDURE — 97140 MANUAL THERAPY 1/> REGIONS: CPT

## 2025-03-26 PROCEDURE — 97110 THERAPEUTIC EXERCISES: CPT

## 2025-03-26 PROCEDURE — 97112 NEUROMUSCULAR REEDUCATION: CPT

## 2025-03-26 PROCEDURE — 97530 THERAPEUTIC ACTIVITIES: CPT

## 2025-03-26 NOTE — PROGRESS NOTES
"Daily Note     Today's date: 3/26/2025  Patient name: Matthew Simeon  : 1985  MRN: [id number]  Referring provider: Vitor Gunderson,*  Dx:   Encounter Diagnosis     ICD-10-CM    1. Status post open reduction with internal fixation (ORIF) of fracture of ankle  Z98.890     Z87.81               Start Time: 930          Subjective: \" I wasn't too sore after last time\" \" I was cleared to drive\"       Objective: See treatment diary below      Assessment: Matthew presents with ankle pain s/p ORIF. Advised to bring shoe next session to progress WB. Added WB exercises without boot to tolerance. Rest breaks provided to facilitate improved form. No pain noted after standing. Educated on scar massage at home to reduce lateral restrictions. Tolerated session without adverse effects. Recommend continued skilled therapy to improve overall strength and mobility for functional return with decreased compensation and pain.        Plan: Continue per plan of care.  Progress treatment as tolerated.       Precautions: major depressive disorder, s/p ORIF     DOS 25      Date 3/3 3/10 3/12 3/24 3/26        Visit # IE 2 3 4 5        FOTO IE             Re-eval IE                 Manuals 3/3 3/10 3/12 3/24 3/26        PROM to pierce   Gentle inv/ev GF           IASTM             Edema massage  KR     Scar massage edu         HS str    30\" x 3           Neuro Re-Ed 3/3 3/10 3/12 3/24 3/26        BAPS board     L3 x 20 CW/ CCW x 20  L3 x 20 CW/ CCW x 20         Towel scrunches   2x20 2 x 20  2x20  2x20         HS curl  3x10  3 x 10  Standing 3 x 10  nv        LAQ  3x10 3\" 3\" 3 x 10           Tandem walking      X 5 laps         Sidestepping     X 5 laps  X 5 laps         Standing hip abd     2x10  nv        Squats     2x10  2x10         Ther Ex 3/3 3/10 3/12 3/24 3/26        Ankle AROM (HEP) 3x10  20x ea X 30 ea          SLR 3 ways (HEP) 3x10  3x10 ea 3 x 10 ea          Gastroc stretch (HEP) 3x30\"  3x30\" 30\" x 3  Slant board 3 x " "30\"  Slant board 3 x 30\"         Hamstring stretch    30\" x 3           HR/ TR    Seated x 20 ea 15# Seated x 20 ea 15#        Pro stretch     Seated 3 x 30\" Seated 3 x 30\"         LP    nv 95# x 20                      Ther Activity 3/3 3/10 3/12 3/24 3/26        Bike     5' ROM  6' ROM         Patient education  KR  ND GF KR KR                                  foto             Modalities 3/3 3/10 3/12          CP prn                                "

## 2025-03-31 ENCOUNTER — OFFICE VISIT (OUTPATIENT)
Dept: PHYSICAL THERAPY | Facility: CLINIC | Age: 40
End: 2025-03-31
Payer: OTHER MISCELLANEOUS

## 2025-03-31 DIAGNOSIS — Z98.890 STATUS POST OPEN REDUCTION WITH INTERNAL FIXATION (ORIF) OF FRACTURE OF ANKLE: Primary | ICD-10-CM

## 2025-03-31 DIAGNOSIS — Z87.81 STATUS POST OPEN REDUCTION WITH INTERNAL FIXATION (ORIF) OF FRACTURE OF ANKLE: Primary | ICD-10-CM

## 2025-03-31 PROCEDURE — 97530 THERAPEUTIC ACTIVITIES: CPT

## 2025-03-31 PROCEDURE — 97112 NEUROMUSCULAR REEDUCATION: CPT

## 2025-03-31 PROCEDURE — 97110 THERAPEUTIC EXERCISES: CPT

## 2025-03-31 NOTE — PROGRESS NOTES
"Daily Note     Today's date: 3/31/2025  Patient name: Matthew Simeon  : 1985  MRN: [id number]  Referring provider: Vitor Gunderson,*  Dx:   Encounter Diagnosis     ICD-10-CM    1. Status post open reduction with internal fixation (ORIF) of fracture of ankle  Z98.890     Z87.81                            Subjective: \" I am wearing the show now and I drove with it- it is weak\" \" I had no soreness after last time\"       Objective: See treatment diary below      Assessment: Matthew presents with ankle pain s/p ORIF. Progressed standing ankle exercises to tolerance note increased fatigue in the form of shaking throughout. Note continued lateral hip weakness improved standing tolerance. Progressed balance with increased ankle sway/ movement for compensation. Tolerated session without adverse effects. Recommend continued skilled therapy to improve overall strength and mobility for functional return with decreased compensation and pain.        Plan: Continue per plan of care.  Progress treatment as tolerated.       Precautions: major depressive disorder, s/p ORIF     DOS 25      Date 3/3 3/10 3/12 3/24 3/26 3/31       Visit # IE 2 3 4 5 6       FOTO IE     X        Re-eval IE                 Manuals 3/3 3/10 3/12 3/24 3/26 3/31       PROM to pierce   Gentle inv/ev GF           IASTM             Edema massage  KR     Scar massage edu         HS str    30\" x 3           Neuro Re-Ed 3/3 3/10 3/12 3/24 3/26 3/31       BAPS board     L3 x 20 CW/ CCW x 20  L3 x 20 CW/ CCW x 20         Towel scrunches   2x20 2 x 20  2x20  2x20         HS curl  3x10  3 x 10  Standing 3 x 10  nv        Marching laps       X 5 laps        Rockerboard              LAQ  3x10 3\" 3\" 3 x 10           Tandem walking      X 5 laps  X 5 laps        Sidestepping     X 5 laps  X 5 laps  OTB x 5 laps        Standing hip abd     2x10  nv OTB x 20 B        SLS      5x 10\"        Squats     2x10  2x10  OTB x 20        Ther Ex 3/3 3/10 3/12 3/24 3/26 " "3/31       Ankle AROM (HEP) 3x10  20x ea X 30 ea          SLR 3 ways (HEP) 3x10  3x10 ea 3 x 10 ea          Gastroc stretch (HEP) 3x30\"  3x30\" 30\" x 3  Slant board 3 x 30\"  Slant board 3 x 30\"  Slant board 3 x 30\"        Hamstring stretch    30\" x 3           HR/ TR    Seated x 20 ea 15# Seated x 20 ea 15# Standing 20x        Pro stretch     Seated 3 x 30\" Seated 3 x 30\"         LP    nv 95# x 20  125# x 30                     Ther Activity 3/3 3/10 3/12 3/24 3/26 3/31       Bike     5' ROM  6' ROM  6' ROM       Patient education  KR  ND GF KR KR KR gait                                  foto     X        Modalities 3/3 3/10 3/12   3/31       CP prn                                "

## 2025-04-02 ENCOUNTER — OFFICE VISIT (OUTPATIENT)
Dept: PHYSICAL THERAPY | Facility: CLINIC | Age: 40
End: 2025-04-02
Payer: OTHER MISCELLANEOUS

## 2025-04-02 DIAGNOSIS — Z98.890 STATUS POST OPEN REDUCTION WITH INTERNAL FIXATION (ORIF) OF FRACTURE OF ANKLE: Primary | ICD-10-CM

## 2025-04-02 DIAGNOSIS — Z87.81 STATUS POST OPEN REDUCTION WITH INTERNAL FIXATION (ORIF) OF FRACTURE OF ANKLE: Primary | ICD-10-CM

## 2025-04-02 PROCEDURE — 97530 THERAPEUTIC ACTIVITIES: CPT

## 2025-04-02 PROCEDURE — 97110 THERAPEUTIC EXERCISES: CPT

## 2025-04-02 PROCEDURE — 97112 NEUROMUSCULAR REEDUCATION: CPT

## 2025-04-02 NOTE — PROGRESS NOTES
"Daily Note     Today's date: 2025  Patient name: Matthew Simeon  : 1985  MRN: [id number]  Referring provider: Vitor Gunderson,*  Dx:   No diagnosis found.                       Subjective: \" I am wearing the show now and I drove with it- it is weak\" \" I had no soreness after last time\"       Objective: See treatment diary below      Assessment: Matthew presents with ankle pain s/p ORIF. Progressed LE strengthening with increased band resistance. Fatigue in the hip noted. Improved ankle response and activation with balance. Note lateral ankle flare with HR cuing to reduce. Gross fatigue with ankle activation in the form of shaking.  Tolerated session without adverse effects. Recommend continued skilled therapy to improve overall strength and mobility for functional return with decreased compensation and pain.        Plan: Continue per plan of care.  Progress treatment as tolerated.       Precautions: major depressive disorder, s/p ORIF     DOS 25      Date 3/3 3/10 3/12 3/24 3/26 3/31 4/2      Visit # IE 2 3 4 5 6 7      FOTO IE     X        Re-eval IE                 Manuals 3/3 3/10 3/12 3/24 3/26 3/31 4/2      PROM to pierce   Gentle inv/ev GF           IASTM             Edema massage  KR     Scar massage edu         HS str    30\" x 3           Neuro Re-Ed 3/3 3/10 3/12 3/24 3/26 3/31 4/2      BAPS board     L3 x 20 CW/ CCW x 20  L3 x 20 CW/ CCW x 20         Towel scrunches   2x20 2 x 20  2x20  2x20         HS curl  3x10  3 x 10  Standing 3 x 10  nv        Marching laps       X 5 laps  Suitcase x 5 laps       Rockerboard        X20 fwd/ lat       LAQ  3x10 3\" 3\" 3 x 10           Tandem walking      X 5 laps  X 5 laps  X 5 laps  foam     Sidestepping     X 5 laps  X 5 laps  OTB x 5 laps  GTB x5 laps       Standing hip abd     2x10  nv OTB x 20 B  GTB x 20 B      SLS      5x 10\"  5x10\"       Squats     2x10  2x10  OTB x 20  GTB x 20       Ther Ex 3/3 3/10 3/12 3/24 3/26 3/31 4/2      Ankle AROM " "(HEP) 3x10  20x ea X 30 ea          SLR 3 ways (HEP) 3x10  3x10 ea 3 x 10 ea          Gastroc stretch (HEP) 3x30\"  3x30\" 30\" x 3  Slant board 3 x 30\"  Slant board 3 x 30\"  Slant board 3 x 30\"  Slant board 3 x 30\"       Hamstring stretch    30\" x 3           HR/ TR    Seated x 20 ea 15# Seated x 20 ea 15# Standing 20x  Standing x 20       Pro stretch     Seated 3 x 30\" Seated 3 x 30\"   Standing 3 x 30\"       LP    nv 95# x 20  125# x 30  125# x 30                    Ther Activity 3/3 3/10 3/12 3/24 3/26 3/31 4/2      Bike     5' ROM  6' ROM  6' ROM 6' ROM      Patient education  KR  ND GF KR KR KR gait        Monster walks        GTB x 5 laps                    foto     X        Modalities 3/3 3/10 3/12   3/31 4/2      CP prn                                "

## 2025-04-07 ENCOUNTER — OFFICE VISIT (OUTPATIENT)
Dept: PHYSICAL THERAPY | Facility: CLINIC | Age: 40
End: 2025-04-07
Payer: OTHER MISCELLANEOUS

## 2025-04-07 DIAGNOSIS — Z98.890 STATUS POST OPEN REDUCTION WITH INTERNAL FIXATION (ORIF) OF FRACTURE OF ANKLE: Primary | ICD-10-CM

## 2025-04-07 DIAGNOSIS — Z87.81 STATUS POST OPEN REDUCTION WITH INTERNAL FIXATION (ORIF) OF FRACTURE OF ANKLE: Primary | ICD-10-CM

## 2025-04-07 PROCEDURE — 97530 THERAPEUTIC ACTIVITIES: CPT

## 2025-04-07 PROCEDURE — 97112 NEUROMUSCULAR REEDUCATION: CPT

## 2025-04-07 PROCEDURE — 97110 THERAPEUTIC EXERCISES: CPT

## 2025-04-07 NOTE — PROGRESS NOTES
"Daily Note     Today's date: 2025  Patient name: Matthew Simeon  : 1985  MRN: [id number]  Referring provider: Vitor Gunderson,*  Dx:   Encounter Diagnosis     ICD-10-CM    1. Status post open reduction with internal fixation (ORIF) of fracture of ankle  Z98.890     Z87.81                   Start Time: 1630          Subjective: \" I have some pain today- I didn't do anything different- notes playing nerf with son increased night swelling no ice          Objective: See treatment diary below      Assessment: Matthew presents with ankle pain s/p ORIF. Arrived with increased pain today focused on mobility and stability without increased weight today. Note increased fatigue in the form of shaking throughout the foot. Note continued decreased toe/ intrinsic activation/ mobility. Progressed to foam today noting increased fatigue in the form of shaking throughout.  Tolerated session without adverse effects. Recommend continued skilled therapy to improve overall strength and mobility for functional return with decreased compensation and pain.        Plan: Continue per plan of care.  Progress treatment as tolerated.       Precautions: major depressive disorder, s/p ORIF     DOS 25      Date 3/3 3/10 3/12 3/24 3/26 3/31 4/2 4/7     Visit # IE 2 3 4 5 6 7 8     FOTO IE     X        Re-eval IE                 Manuals 3/3 3/10 3/12 3/24 3/26 3/31 4/2 4/7     PROM to pierce   Gentle inv/ev GF           IASTM             Edema massage  KR     Scar massage edu         HS str    30\" x 3           Neuro Re-Ed 3/3 3/10 3/12 3/24 3/26 3/31 4/2 4/7     BAPS board     L3 x 20 CW/ CCW x 20  L3 x 20 CW/ CCW x 20         Towel scrunches   2x20 2 x 20  2x20  2x20    2' and toe yoga     HS curl  3x10  3 x 10  Standing 3 x 10  nv        Marching laps       X 5 laps  Suitcase x 5 laps  X 3 laps foam      Rockerboard        X20 fwd/ lat  X20 fwd/ lat      LAQ  3x10 3\" 3\" 3 x 10           Tandem walking      X 5 laps  X 5 laps  X 5 " "laps  Foam x 5 laps      Sidestepping     X 5 laps  X 5 laps  OTB x 5 laps  GTB x5 laps  GTB x 5 laps      Standing hip abd     2x10  nv OTB x 20 B  GTB x 20 B GTB x 20 B      SLS      5x 10\"  5x10\"  5x10\"  R LE      Squats     2x10  2x10  OTB x 20  GTB x 20  GTB x 20      Ther Ex 3/3 3/10 3/12 3/24 3/26 3/31 4/2 4/7     Ankle AROM (HEP) 3x10  20x ea X 30 ea     BTB 4 ways 20      SLR 3 ways (HEP) 3x10  3x10 ea 3 x 10 ea          Gastroc stretch (HEP) 3x30\"  3x30\" 30\" x 3  Slant board 3 x 30\"  Slant board 3 x 30\"  Slant board 3 x 30\"  Slant board 3 x 30\"       Hamstring stretch    30\" x 3           HR/ TR    Seated x 20 ea 15# Seated x 20 ea 15# Standing 20x  Standing x 20  Standing x 30 ea      Pro stretch     Seated 3 x 30\" Seated 3 x 30\"   Standing 3 x 30\"       LP    nv 95# x 20  125# x 30  125# x 30                    Ther Activity 3/3 3/10 3/12 3/24 3/26 3/31 4/2 4/7     Bike     5' ROM  6' ROM  6' ROM 6' ROM L2 x 6' ROM     Patient education  KR  ND GF KR KR KR gait        Monster walks        GTB x 5 laps                    foto     X        Modalities 3/3 3/10 3/12   3/31 4/2 4/7     CP prn                                "

## 2025-04-09 ENCOUNTER — EVALUATION (OUTPATIENT)
Dept: PHYSICAL THERAPY | Facility: CLINIC | Age: 40
End: 2025-04-09
Payer: OTHER MISCELLANEOUS

## 2025-04-09 DIAGNOSIS — Z87.81 STATUS POST OPEN REDUCTION WITH INTERNAL FIXATION (ORIF) OF FRACTURE OF ANKLE: Primary | ICD-10-CM

## 2025-04-09 DIAGNOSIS — Z98.890 STATUS POST OPEN REDUCTION WITH INTERNAL FIXATION (ORIF) OF FRACTURE OF ANKLE: Primary | ICD-10-CM

## 2025-04-09 PROCEDURE — 97112 NEUROMUSCULAR REEDUCATION: CPT

## 2025-04-09 PROCEDURE — 97530 THERAPEUTIC ACTIVITIES: CPT

## 2025-04-09 PROCEDURE — 97164 PT RE-EVAL EST PLAN CARE: CPT

## 2025-04-09 PROCEDURE — 97110 THERAPEUTIC EXERCISES: CPT

## 2025-04-09 PROCEDURE — 97140 MANUAL THERAPY 1/> REGIONS: CPT

## 2025-04-09 NOTE — PROGRESS NOTES
PT Re-Evaluation     Today's date: 2025  Patient name: Matthew Simeon  : 1985  MRN: [id number]  Referring provider: Vitor Gunderson,*  Dx:   Encounter Diagnosis     ICD-10-CM    1. Status post open reduction with internal fixation (ORIF) of fracture of ankle  Z98.890     Z87.81             Start Time: 1530          Assessment  Impairments: abnormal or restricted ROM, impaired balance, impaired physical strength and pain with function  Symptom irritability: moderate    Assessment details: Matthew Simeon  is a pleasant 39 y.o. male who presents with R ankle pain secondary to ORIF on 25. Demonstrates continued decreased ankle AROM and strength deficits. Improve AROM in all planes fro initial visit. Continued ambulation and weightbearing deficits limiting return to work. Gross foot stability and balance deficits persist. Tolerated session without adverse effects. Recommend continued skilled therapy to improve overall strength and mobility for functional return with decreased compensation and pain.    Understanding of Dx/Px/POC: good     Prognosis: good    Goals  Impairment Goals  - Pt I with initial HEP in 1-2 visits- MET   - Improve ankle  ROM equal to contralateral side in 4-6 weeks- PROGRESSING   - Increase strength to 5/5 in all affected areas in 4-6 weeks- PROGRESSING     Functional Goals  - Increase Functional Status Measure to: MDC in 6-8 weeks-PROGRESSING   - Patient will be independent with comprehensive HEP in 6-8 weeks- PROGRESSING   - Ambulation is improved to prior level of function in 6-8 weeks- PROGRESSING WB in shoe   - Stair climbing is improved to prior level of function in 6-8 weeks- PROGRESSING in shoe  - Squatting is improved to prior level of function in 6-8 weeks - PROGRESSING   - Patient will return to work duties without limitation or pain to facilitate return to work without pain or limitation by d/c - PROGRESSING     Plan  Patient would benefit from: PT eval and skilled  "physical therapy  Referral necessary: No  Planned modality interventions: thermotherapy: hydrocollator packs and cryotherapy    Planned therapy interventions: IASTM, joint mobilization, kinesiology taping, manual therapy, nerve gliding, neuromuscular re-education, patient/caregiver education, postural training, strengthening, stretching, therapeutic activities, therapeutic exercise, home exercise program and balance    Frequency: 2x week  Duration in weeks: 8  Treatment plan discussed with: patient      Subjective Evaluation    History of Present Illness  Mechanism of injury: surgery  Mechanism of injury: 4/9/25: Matthew presents to therapy with ankle pain/ mobility deficits. Following 9 visits of PT. Notes overall improvement in mobility and reduced pain. Able to WB in shoe. Continued pain levels with prolonged ambulation.        Matthew Simeon presents with c/c of R pain secondary to ORIF. DOS was 2/6/4. Reports numbness/ edema in the toes that is returning. Reports ankle fx occurred secondary to fall at work on 1/26/24. Currently in a CAM boot with crutches and to \" put weight only on the heel\" as per provider. Not allowed to lift or drive currently. Follow up with physician on 3/20. Was immobilized in splint for 10 days prior to surgical repair and then been in boot since surgical repair.   Aggravating factors: dependent pain, periodic pain   Relieving factors: ibuprofen and ice   24hr pain pattern: 6/10 (current), 6/10 (best), 10/10 (worst), location:lateral ankle incision and lateral foot , descriptors: sharp/ burning   Imaging: XR ANKLE 3+ VW RIGHT     INDICATION: Z98.890: Other specified postprocedural states  Z87.81: Personal history of (healed) traumatic fracture.     COMPARISON: Right ankle x-ray 1/27/2025     FINDINGS:     Stable alignment of distal fibular fracture with stable plate and screw fixation hardware.     No significant degenerative changes.     No lytic or blastic osseous lesion.   " "  Unremarkable soft tissues.     IMPRESSION:     Stable alignment of distal fibular fracture status post ORIF.    Previous treatments: none   Occupation/recreation:  at [employer] has to walk 2-3 miles per night, lifting up to 50#   Patient goals: \" build the calf muscle back up\"            Not a recurrent problem   Quality of life: good        Objective     Observations     Right Ankle/Foot   Positive for atrophy and incision. Negative for edema.     Additional Observation Details  Lateral ankle incision scabbing no signs of infection educated on signs of infection     Palpation     Additional Palpation Details  TTP in the ant tib and gastroc   Tib pain   Peroneal pain     Neurological Testing     Sensation     Ankle/Foot   Left Ankle/Foot   Intact: light touch    Right Ankle/Foot   Intact: light touch     Comments   Left light touch: ankle.   Right light touch: ankle.     Active Range of Motion   Left Ankle/Foot   Normal active range of motion    Right Ankle/Foot   Dorsiflexion (ke): 10 degrees   Plantar flexion: 40 degrees   Inversion: 30 degrees   Eversion: 12 degrees     Joint Play     Additional Joint Play Details  Did not assess today     Strength/Myotome Testing     Left Ankle/Foot   Normal strength    Right Ankle/Foot   Dorsiflexion: 4  Plantar flexion: 4  Inversion: 4  Eversion: 4    Swelling   Left Ankle/Foot   Figure 8: 32.5 cm    Right Ankle/Foot   Figure 8: 33 cm    General Comments:      Ankle/Foot Comments   WB in shoe                Precautions: major depressive disorder, s/p ORIF     DOS 2/6/25      Date 3/3 3/10 3/12 3/24 3/26 3/31 4/2 4/7  4/9     Visit # IE 2 3 4 5 6 7 8  9     FOTO IE        X             Re-eval IE                X           Manuals 3/3 3/10 3/12 3/24 3/26 3/31 4/2 4/7  4/9     PROM to iperce    Gentle inv/ev GF                  IASTM                  tennis ball release KR      Edema massage  KR        Scar massage edu              HS str      30\" x 3            " "      Neuro Re-Ed 3/3 3/10 3/12 3/24 3/26 3/31 4/2 4/7  4/9     BAPS board        L3 x 20 CW/ CCW x 20  L3 x 20 CW/ CCW x 20              Towel scrunches    2x20 2 x 20  2x20  2x20      2' and toe yoga       HS curl   3x10  3 x 10  Standing 3 x 10  nv             Marching laps            X 5 laps  Suitcase x 5 laps  X 3 laps foam        Rockerboard              X20 fwd/ lat  X20 fwd/ lat   x20 fwd single leg tap      LAQ   3x10 3\" 3\" 3 x 10                  Tandem walking          X 5 laps  X 5 laps  X 5 laps  Foam x 5 laps        Sidestepping        X 5 laps  X 5 laps  OTB x 5 laps  GTB x5 laps  GTB x 5 laps        Standing hip abd        2x10  nv OTB x 20 B  GTB x 20 B GTB x 20 B        SLS           5x 10\"  5x10\"  5x10\"  R LE   5x10\" R LE      Squats        2x10  2x10  OTB x 20  GTB x 20  GTB x 20        Ther Ex 3/3 3/10 3/12 3/24 3/26 3/31 4/2 4/7  4/9     Ankle AROM (HEP) 3x10  20x ea X 30 ea         BTB 4 ways 20        SLR 3 ways (HEP) 3x10  3x10 ea 3 x 10 ea                 Gastroc stretch (HEP) 3x30\"  3x30\" 30\" x 3  Slant board 3 x 30\"  Slant board 3 x 30\"  Slant board 3 x 30\"  Slant board 3 x 30\"     slant board 3 x 30\"      Hamstring stretch      30\" x 3                  HR/ TR       Seated x 20 ea 15# Seated x 20 ea 15# Standing 20x  Standing x 20  Standing x 30 ea        Pro stretch        Seated 3 x 30\" Seated 3 x 30\"    Standing 3 x 30\"     3x30\" seated      LP       nv 95# x 20  125# x 30  125# x 30                                  Ther Activity 3/3 3/10 3/12 3/24 3/26 3/31 4/2 4/7  4/9     Bike        5' ROM  6' ROM  6' ROM 6' ROM L2 x 6' ROM  L3 x 6'      Patient education  KR  ND GF KR KR KR gait            Monster walks              GTB x 5 laps           RE                  KR     foto         X        X     Modalities 3/3 3/10 3/12     3/31 4/2 4/7       CP prn                                                  "

## 2025-04-10 DIAGNOSIS — A60.00 HERPES SIMPLEX INFECTION OF GENITOURINARY SYSTEM: ICD-10-CM

## 2025-04-10 RX ORDER — VALACYCLOVIR HYDROCHLORIDE 500 MG/1
500 TABLET, FILM COATED ORAL 2 TIMES DAILY
Qty: 20 TABLET | Refills: 0 | Status: SHIPPED | OUTPATIENT
Start: 2025-04-10 | End: 2025-05-10

## 2025-04-10 NOTE — TELEPHONE ENCOUNTER
Reason for call:   [x] Refill   [] Prior Auth  [] Other:     Office:   [x] PCP/Provider -   [] Specialty/Provider -     Medication: valACYclovir (VALTREX) 500 mg     Dose/Frequency: Take 1 tablet (500 mg total) by mouth 2 (two) times a day     Quantity: 20    Pharmacy: [facility]/pharmacy #[id number] - [facility] - [address] [place] [address] Pharmacy   Does the patient have enough for 3 days?   [] Yes   [x] No - Send as HP to POD    Mail Away Pharmacy   Does the patient have enough for 10 days?   [] Yes   [] No - Send as HP to POD

## 2025-04-14 ENCOUNTER — OFFICE VISIT (OUTPATIENT)
Dept: PHYSICAL THERAPY | Facility: CLINIC | Age: 40
End: 2025-04-14
Payer: OTHER MISCELLANEOUS

## 2025-04-14 ENCOUNTER — OFFICE VISIT (OUTPATIENT)
Age: 40
End: 2025-04-14
Payer: COMMERCIAL

## 2025-04-14 VITALS
RESPIRATION RATE: 16 BRPM | BODY MASS INDEX: 30.35 KG/M2 | TEMPERATURE: 98.2 F | HEART RATE: 86 BPM | HEIGHT: 71 IN | SYSTOLIC BLOOD PRESSURE: 125 MMHG | OXYGEN SATURATION: 97 % | DIASTOLIC BLOOD PRESSURE: 89 MMHG | WEIGHT: 216.8 LBS

## 2025-04-14 DIAGNOSIS — F17.290 OTHER TOBACCO PRODUCT NICOTINE DEPENDENCE, UNCOMPLICATED: ICD-10-CM

## 2025-04-14 DIAGNOSIS — Z00.00 ANNUAL PHYSICAL EXAM: Primary | ICD-10-CM

## 2025-04-14 DIAGNOSIS — Z87.438 HISTORY OF PROSTATITIS: ICD-10-CM

## 2025-04-14 DIAGNOSIS — Z87.81 STATUS POST OPEN REDUCTION WITH INTERNAL FIXATION (ORIF) OF FRACTURE OF ANKLE: Primary | ICD-10-CM

## 2025-04-14 DIAGNOSIS — E78.2 MIXED HYPERLIPIDEMIA: ICD-10-CM

## 2025-04-14 DIAGNOSIS — Z98.890 STATUS POST OPEN REDUCTION WITH INTERNAL FIXATION (ORIF) OF FRACTURE OF ANKLE: Primary | ICD-10-CM

## 2025-04-14 PROCEDURE — 99395 PREV VISIT EST AGE 18-39: CPT | Performed by: INTERNAL MEDICINE

## 2025-04-14 PROCEDURE — 97110 THERAPEUTIC EXERCISES: CPT

## 2025-04-14 PROCEDURE — 97112 NEUROMUSCULAR REEDUCATION: CPT

## 2025-04-14 PROCEDURE — 97140 MANUAL THERAPY 1/> REGIONS: CPT

## 2025-04-14 PROCEDURE — 97530 THERAPEUTIC ACTIVITIES: CPT

## 2025-04-14 RX ORDER — NICOTINE 21 MG/24HR
1 PATCH, TRANSDERMAL 24 HOURS TRANSDERMAL EVERY 24 HOURS
Qty: 28 PATCH | Refills: 0 | Status: SHIPPED | OUTPATIENT
Start: 2025-04-14

## 2025-04-14 NOTE — PROGRESS NOTES
Adult Annual Physical  Name: Matthew Simeon      : 1985      MRN: [id number]  Encounter Provider: Karen Dawson MD  Encounter Date: 2025   Encounter department: [facility] PRIMARY CARE    :  Assessment & Plan  Annual physical exam    Orders:    CBC and differential; Future    Comprehensive metabolic panel; Future    Other tobacco product nicotine dependence, uncomplicated    Orders:    nicotine (NICODERM CQ) 14 mg/24hr TD 24 hr patch; Place 1 patch on the skin over 24 hours every 24 hours    CBC and differential; Future    Comprehensive metabolic panel; Future    History of prostatitis    Orders:    UA w Reflex to Microscopic w Reflex to Culture; Future    CBC and differential; Future    Comprehensive metabolic panel; Future    Mixed hyperlipidemia    Orders:    CBC and differential; Future    Comprehensive metabolic panel; Future    Lipid panel; Future        Preventive Screenings:  - Diabetes Screening: screening up-to-date  - Cholesterol Screening: screening not indicated and has hyperlipidemia   - Hepatitis C screening: screening up-to-date   - HIV screening: screening up-to-date   - Colon cancer screening: screening not indicated   - Lung cancer screening: screening not indicated   - Prostate cancer screening: screening not indicated     Immunizations:  - Immunizations due: Influenza    Counseling/Anticipatory Guidance:  - Alcohol: discussed moderation in alcohol intake and recommendations for healthy alcohol use.   - Dental health: discussed importance of regular tooth brushing, flossing, and dental visits.   - Diet: discussed recommendations for a healthy/well-balanced diet.   - Exercise: the importance of regular exercise/physical activity was discussed. Recommend exercise 3-5 times per week for at least 30 minutes.       Depression Screening and Follow-up Plan: Patient was screened for depression during today's encounter. They screened negative with a PHQ-9 score of  2.      Tobacco Cessation Counseling: Tobacco cessation counseling was provided. The patient is sincerely urged to quit consumption of tobacco. He is ready to quit tobacco. Medication options and side effects of medication discussed. Nicotine patch was prescribed.         History of Present Illness     Adult Annual Physical:  Patient presents for annual physical.     Diet and Physical Activity:  - Diet/Nutrition: high fat diet, poor diet, no special diet and frequent junk food.  - Exercise: no formal exercise, walking and 30-60 minutes on average.    Depression Screening:    - PHQ-9 Score: 2    General Health:  - Sleep: unrefreshing sleep, daytime hypersomnolence and sleeps well. works nights so sleep is messed up  - Hearing: normal hearing bilateral ears.  - Vision: no vision problems, wears glasses and contacts and most recent eye exam > 1 year ago.  - Dental: brushes teeth once daily and floss regularly.     Health:  - History of STDs: yes.   - Urinary symptoms: none.     Advanced Care Planning:  - Has an advanced directive?: no    - Has a durable medical POA?: no    - ACP document given to patient?: no      Review of Systems   Constitutional:  Negative for activity change, appetite change, chills, diaphoresis, fatigue, fever and unexpected weight change.   HENT:  Negative for congestion and sore throat.    Respiratory:  Negative for apnea, cough, choking, chest tightness, shortness of breath, wheezing and stridor.    Cardiovascular:  Negative for chest pain, palpitations and leg swelling.   Gastrointestinal:  Negative for abdominal distention, abdominal pain, blood in stool, constipation, nausea and rectal pain.   Genitourinary:  Negative for dysuria, flank pain, frequency and urgency.   Musculoskeletal:  Negative for arthralgias, back pain, gait problem, joint swelling and myalgias.   Skin:  Negative for color change, pallor and rash.   Neurological:  Negative for headaches.     Medical History Reviewed by  provider this encounter:  Tobacco  Allergies  Meds  Problems  Med Hx  Surg Hx  Fam Hx     .  Past Medical History   Past Medical History:   Diagnosis Date    Asthma     Genital herpes      Past Surgical History:   Procedure Laterality Date    CHG MANUAL APPL STRESS PHYS/QHP JOINT RADIOGRAPHY Right 2/6/2025    Procedure: Right ankle open reduction with internal fixation of bimalleolar fracture;  Surgeon: Vitor Gunderson DPM;  Location: [facility] MAIN OR;  Service: Podiatry    FRACTURE SURGERY Left     tib/fib    MO OPEN TREATMENT BIMALLEOLAR ANKLE FRACTURE Right 2/6/2025    Procedure: Right ankle open reduction with internal fixation of bimalleolar fracture;  Surgeon: Vitor Gunderson DPM;  Location: [facility] MAIN OR;  Service: Podiatry     Family History   Problem Relation Age of Onset    Cancer Father         pancreas    Emphysema Maternal Grandfather     Cancer Paternal Grandfather         prostate    Diabetes Paternal Grandfather       reports that he has quit smoking. His smoking use included cigarettes. He started smoking about 24 years ago. He has a 3.2 pack-year smoking history. He has never used smokeless tobacco. He reports current alcohol use. He reports that he does not currently use drugs after having used the following drugs: Marijuana.  Current Outpatient Medications   Medication Instructions    meloxicam (MOBIC) 7.5 mg, Oral, Daily    nicotine (NICODERM CQ) 14 mg/24hr TD 24 hr patch 1 patch, Transdermal, Every 24 hours    valACYclovir (VALTREX) 500 mg, Oral, 2 times daily   No Known Allergies   Current Outpatient Medications on File Prior to Visit   Medication Sig Dispense Refill    meloxicam (Mobic) 7.5 mg tablet Take 1 tablet (7.5 mg total) by mouth daily for 50 doses 50 tablet 0    valACYclovir (VALTREX) 500 mg tablet Take 1 tablet (500 mg total) by mouth 2 (two) times a day 20 tablet 0    [DISCONTINUED] acetaminophen (TYLENOL) 500 mg tablet Take 1 tablet (500 mg total) by mouth  "every 6 (six) hours as needed for mild pain or moderate pain (Patient not taking: Reported on 4/14/2025) 30 tablet 0    [DISCONTINUED] apixaban (Eliquis) 2.5 mg Take 1 tablet (2.5 mg total) by mouth 2 (two) times a day for 21 days Start the day after surgery 42 tablet 0    [DISCONTINUED] ibuprofen (MOTRIN) 600 mg tablet Take 1 tablet (600 mg total) by mouth every 6 (six) hours as needed for mild pain (Patient not taking: Reported on 4/14/2025) 30 tablet 0    [DISCONTINUED] naloxone (NARCAN) 4 mg/0.1 mL nasal spray Administer 1 spray into a nostril. If no response after 2-3 minutes, give another dose in the other nostril using a new spray. (Patient not taking: Reported on 1/31/2025) 1 each 1    [DISCONTINUED] oxyCODONE-acetaminophen (Percocet) 5-325 mg per tablet Take 1 tablet by mouth every 4 (four) hours as needed for moderate pain for up to 30 doses Max Daily Amount: 6 tablets (Patient not taking: Reported on 4/14/2025) 30 tablet 0     No current facility-administered medications on file prior to visit.      Social History     Tobacco Use    Smoking status: Former     Current packs/day: 0.75     Average packs/day: 0.8 packs/day for 4.3 years (3.2 ttl pk-yrs)     Types: Cigarettes     Start date: 2001    Smokeless tobacco: Never   Vaping Use    Vaping status: Every Day    Substances: Nicotine, Flavoring   Substance and Sexual Activity    Alcohol use: Yes     Comment: few times a month    Drug use: Not Currently     Types: Marijuana    Sexual activity: Yes     Partners: Female     Birth control/protection: I.U.D.       Objective   /89 (Patient Position: Sitting, Cuff Size: Standard)   Pulse 86   Temp 98.2 °F (36.8 °C) (Temporal)   Resp 16   Ht 5' 11\" (1.803 m)   Wt 98.3 kg (216 lb 12.8 oz)   SpO2 97%   BMI 30.24 kg/m²     Physical Exam  Constitutional:       General: He is not in acute distress.     Appearance: Normal appearance. He is normal weight. He is not ill-appearing, toxic-appearing or " diaphoretic.   Cardiovascular:      Rate and Rhythm: Normal rate and regular rhythm.      Pulses: Normal pulses.      Heart sounds: Normal heart sounds. No murmur heard.     No gallop.   Pulmonary:      Effort: Pulmonary effort is normal. No respiratory distress.      Breath sounds: Normal breath sounds. No stridor. No wheezing, rhonchi or rales.   Chest:      Chest wall: No tenderness.   Abdominal:      General: Bowel sounds are normal.      Palpations: Abdomen is soft.      Tenderness: There is no abdominal tenderness.      Hernia: No hernia is present.   Genitourinary:     Testes:         Right: Swelling not present.         Left: Swelling not present.   Skin:     General: Skin is warm and dry.   Neurological:      Mental Status: He is alert and oriented to person, place, and time.

## 2025-04-14 NOTE — PROGRESS NOTES
"Daily Note     Today's date: 2025  Patient name: Matthew Simeon  : 1985  MRN: [id number]  Referring provider: Vitor Gunderson,*  Dx:   Encounter Diagnosis     ICD-10-CM    1. Status post open reduction with internal fixation (ORIF) of fracture of ankle  Z98.890     Z87.81                      Subjective: \" I am feeling ok\"       Objective: See treatment diary below      Assessment: Matthew presents with ankle pain. Note increased achilles restriction throughout the post LE added IASTM to the calf and achilles with mild relief noted post. Focused on eccentric activation to faciltiae improved LE activation and load. Noted fatigue in the LE post. Tolerated session without adverse effects. Recommend continued skilled therapy to improve overall strength and mobility for functional return with decreased compensation and pain.        Plan: Continue per plan of care.      Precautions: major depressive disorder, s/p ORIF     DOS 25      Date 3/3 3/10 3/12 3/24 3/26 3/31 4/2 4/7  4/9  4/14   Visit # IE 2 3 4 5 6 7 8  9  10   FOTO IE        X             Re-eval IE                X           Manuals 3/3 3/10 3/12 3/24 3/26 3/31 4/2 4/7  4/9  4/14   PROM to pierce    Gentle inv/ev GF                  IASTM                  tennis ball release KR   calf and plantar fascia KR   Edema massage  KR        Scar massage edu              HS str      30\" x 3                  Neuro Re-Ed 3/3 3/10 3/12 3/24 3/26 3/31 4/2 4/7  4/9  4/14   BAPS board        L3 x 20 CW/ CCW x 20  L3 x 20 CW/ CCW x 20              Towel scrunches    2x20 2 x 20  2x20  2x20      2' and toe yoga       HS curl   3x10  3 x 10  Standing 3 x 10  nv             Marching laps            X 5 laps  Suitcase x 5 laps  X 3 laps foam        Rockerboard              X20 fwd/ lat  X20 fwd/ lat   x20 fwd single leg tap      LAQ   3x10 3\" 3\" 3 x 10                  Tandem walking          X 5 laps  X 5 laps  X 5 laps  Foam x 5 laps        Sidestepping       " " X 5 laps  X 5 laps  OTB x 5 laps  GTB x5 laps  GTB x 5 laps     GTB x 5 laps    Standing hip abd        2x10  nv OTB x 20 B  GTB x 20 B GTB x 20 B     GTB x 30 B    SLS           5x 10\"  5x10\"  5x10\"  R LE   5x10\" R LE      Squats        2x10  2x10  OTB x 20  GTB x 20  GTB x 20     GTB x 20    Ther Ex 3/3 3/10 3/12 3/24 3/26 3/31 4/2 4/7  4/9  4/14   Ankle AROM (HEP) 3x10  20x ea X 30 ea         BTB 4 ways 20        SLR 3 ways (HEP) 3x10  3x10 ea 3 x 10 ea                 Gastroc stretch (HEP) 3x30\"  3x30\" 30\" x 3  Slant board 3 x 30\"  Slant board 3 x 30\"  Slant board 3 x 30\"  Slant board 3 x 30\"     slant board 3 x 30\"   slant board 3 x 30\"    Hamstring stretch      30\" x 3                  HR/ TR       Seated x 20 ea 15# Seated x 20 ea 15# Standing 20x  Standing x 20  Standing x 30 ea     eccentric x 20    Pro stretch        Seated 3 x 30\" Seated 3 x 30\"    Standing 3 x 30\"     3x30\" seated   3x30\" standing    LP       nv 95# x 20  125# x 30  125# x 30                                  Ther Activity 3/3 3/10 3/12 3/24 3/26 3/31 4/2 4/7  4/9  4/14   Bike        5' ROM  6' ROM  6' ROM 6' ROM L2 x 6' ROM  L3 x 6'   elliptical x 6'    Patient education  KR  ND GF KR KR KR gait            Monster walks              GTB x 5 laps       GTB x 5 laps     RE                  KR  KR   foto         X        X     Modalities 3/3 3/10 3/12     3/31 4/2 4/7       CP prn                                                       "

## 2025-04-14 NOTE — PATIENT INSTRUCTIONS
"Patient Education     Routine physical for adults   The Basics   Written by the doctors and editors at [facility]   What is a physical? -- A physical is a routine visit, or \"check-up,\" with your doctor. You might also hear it called a \"wellness visit\" or \"preventive visit.\"  During each visit, the doctor will:   Ask about your physical and mental health   Ask about your habits, behaviors, and lifestyle   Do an exam   Give you vaccines if needed   Talk to you about any medicines you take   Give advice about your health   Answer your questions  Getting regular check-ups is an important part of taking care of your health. It can help your doctor find and treat any problems you have. But it's also important for preventing health problems.  A routine physical is different from a \"sick visit.\" A sick visit is when you see a doctor because of a health concern or problem. Since physicals are scheduled ahead of time, you can think about what you want to ask the doctor.  How often should I get a physical? -- It depends on your age and health. In general, for people age 21 years and older:   If you are younger than 50 years, you might be able to get a physical every 3 years.   If you are 50 years or older, your doctor might recommend a physical every year.  If you have an ongoing health condition, like diabetes or high blood pressure, your doctor will probably want to see you more often.  What happens during a physical? -- In general, each visit will include:   Physical exam - The doctor or nurse will check your height, weight, heart rate, and blood pressure. They will also look at your eyes and ears. They will ask about how you are feeling and whether you have any symptoms that bother you.   Medicines - It's a good idea to bring a list of all the medicines you take to each doctor visit. Your doctor will talk to you about your medicines and answer any questions. Tell them if you are having any side effects that bother you. You " "should also tell them if you are having trouble paying for any of your medicines.   Habits and behaviors - This includes:   Your diet   Your exercise habits   Whether you smoke, drink alcohol, or use drugs   Whether you are sexually active   Whether you feel safe at home  Your doctor will talk to you about things you can do to improve your health and lower your risk of health problems. They will also offer help and support. For example, if you want to quit smoking, they can give you advice and might prescribe medicines. If you want to improve your diet or get more physical activity, they can help you with this, too.   Lab tests, if needed - The tests you get will depend on your age and situation. For example, your doctor might want to check your:   Cholesterol   Blood sugar   Iron level   Vaccines - The recommended vaccines will depend on your age, health, and what vaccines you already had. Vaccines are very important because they can prevent certain serious or deadly infections.   Discussion of screening - \"Screening\" means checking for diseases or other health problems before they cause symptoms. Your doctor can recommend screening based on your age, risk, and preferences. This might include tests to check for:   Cancer, such as breast, prostate, cervical, ovarian, colorectal, prostate, lung, or skin cancer   Sexually transmitted infections, such as chlamydia and gonorrhea   Mental health conditions like depression and anxiety  Your doctor will talk to you about the different types of screening tests. They can help you decide which screenings to have. They can also explain what the results might mean.   Answering questions - The physical is a good time to ask the doctor or nurse questions about your health. If needed, they can refer you to other doctors or specialists, too.  Adults older than 65 years often need other care, too. As you get older, your doctor will talk to you about:   How to prevent falling at " home   Hearing or vision tests   Memory testing   How to take your medicines safely   Making sure that you have the help and support you need at home  All topics are updated as new evidence becomes available and our peer review process is complete.  This topic retrieved from [employer] on: May 02, 2024.  Topic 878502 Version 1.0  Release: 32.4.3 - C32.122  © 2024 UpToDate, Inc. and/or its affiliates. All rights reserved.  Consumer Information Use and Disclaimer   Disclaimer: This generalized information is a limited summary of diagnosis, treatment, and/or medication information. It is not meant to be comprehensive and should be used as a tool to help the user understand and/or assess potential diagnostic and treatment options. It does NOT include all information about conditions, treatments, medications, side effects, or risks that may apply to a specific patient. It is not intended to be medical advice or a substitute for the medical advice, diagnosis, or treatment of a health care provider based on the health care provider's examination and assessment of a patient's specific and unique circumstances. Patients must speak with a health care provider for complete information about their health, medical questions, and treatment options, including any risks or benefits regarding use of medications. This information does not endorse any treatments or medications as safe, effective, or approved for treating a specific patient. UpToDate, Inc. and its affiliates disclaim any warranty or liability relating to this information or the use thereof.The use of this information is governed by the Terms of Use, available at https://www.[employer].com/en/know/clinical-effectiveness-terms. 2024© UpToDate, Inc. and its affiliates and/or licensors. All rights reserved.  Copyright   © 2024 UpToDate, Inc. and/or its affiliates. All rights reserved.

## 2025-04-16 ENCOUNTER — APPOINTMENT (OUTPATIENT)
Dept: PHYSICAL THERAPY | Facility: CLINIC | Age: 40
End: 2025-04-16
Payer: OTHER MISCELLANEOUS

## 2025-04-21 ENCOUNTER — OFFICE VISIT (OUTPATIENT)
Dept: PHYSICAL THERAPY | Facility: CLINIC | Age: 40
End: 2025-04-21
Payer: OTHER MISCELLANEOUS

## 2025-04-21 DIAGNOSIS — Z98.890 STATUS POST OPEN REDUCTION WITH INTERNAL FIXATION (ORIF) OF FRACTURE OF ANKLE: Primary | ICD-10-CM

## 2025-04-21 DIAGNOSIS — Z87.81 STATUS POST OPEN REDUCTION WITH INTERNAL FIXATION (ORIF) OF FRACTURE OF ANKLE: Primary | ICD-10-CM

## 2025-04-21 PROCEDURE — 97112 NEUROMUSCULAR REEDUCATION: CPT

## 2025-04-21 PROCEDURE — 97110 THERAPEUTIC EXERCISES: CPT

## 2025-04-21 PROCEDURE — 97530 THERAPEUTIC ACTIVITIES: CPT

## 2025-04-21 NOTE — PROGRESS NOTES
"Daily Note     Today's date: 2025  Patient name: Matthew Simeon  : 1985  MRN: [id number]  Referring provider: Vitor Gunderson,*  Dx:   Encounter Diagnosis     ICD-10-CM    1. Status post open reduction with internal fixation (ORIF) of fracture of ankle  Z98.890     Z87.81                        Subjective: \" I am feeling alright today\"       Objective: See treatment diary below      Assessment: Matthew presents with ankle pain. Progressed LE strengthening to tolerance with emphasis on LE activation and balance.  Note gross stability deficits and fatigue in the form of shaking on B LE. Progressed lat hip strength with general fatigue.  Tolerated session without adverse effects. Recommend continued skilled therapy to improve overall strength and mobility for functional return with decreased compensation and pain.        Plan: Continue per plan of care.      Precautions: major depressive disorder, s/p ORIF     DOS 25      Date 4/21 3/10 3/12 3/24 3/26 3/31 4/2 4/7  4/9  4/14   Visit # 11 2 3 4 5 6 7 8  9  10   FOTO         X             Re-eval                 X           Manuals 4/21 3/10 3/12 3/24 3/26 3/31 4/2 4/7  4/9  4/14   PROM to pierce   Gentle inv/ev GF                  IASTM                 tennis ball release KR   calf and plantar fascia KR   Edema massage         Scar massage edu              HS str     30\" x 3                  Neuro Re-Ed 4/21 3/10 3/12 3/24 3/26 3/31 4/2 4/7  4/9  4/14   BAPS board       L3 x 20 CW/ CCW x 20  L3 x 20 CW/ CCW x 20              Towel scrunches   2x20 2 x 20  2x20  2x20      2' and toe yoga       HS curl  3x10  3 x 10  Standing 3 x 10  nv             Marching laps  Suitcase on foam 15# x 5 laps          X 5 laps  Suitcase x 5 laps  X 3 laps foam        Rockerboard             X20 fwd/ lat  X20 fwd/ lat   x20 fwd single leg tap      LAQ  3x10 3\" 3\" 3 x 10                  Tandem walking  Foam x 5 laps        X 5 laps  X 5 laps  X 5 laps  Foam x 5 laps      " "  Sidestepping  BTB x 5 laps      X 5 laps  X 5 laps  OTB x 5 laps  GTB x5 laps  GTB x 5 laps     GTB x 5 laps    Standing hip abd  BTB x 20 ea      2x10  nv OTB x 20 B  GTB x 20 B GTB x 20 B     GTB x 30 B    SLS          5x 10\"  5x10\"  5x10\"  R LE   5x10\" R LE      Lunges  X20 B            Squats  On bosu x 20      2x10  2x10  OTB x 20  GTB x 20  GTB x 20     GTB x 20    Ther Ex 4/21 3/10 3/12 3/24 3/26 3/31 4/2 4/7  4/9  4/14   Ankle AROM (HEP)  20x ea X 30 ea         BTB 4 ways 20        SLR 3 ways (HEP)  3x10 ea 3 x 10 ea                 Gastroc stretch (HEP) Standing 3 x 30\" slant board  3x30\" 30\" x 3  Slant board 3 x 30\"  Slant board 3 x 30\"  Slant board 3 x 30\"  Slant board 3 x 30\"     slant board 3 x 30\"   slant board 3 x 30\"    Hamstring stretch     30\" x 3                  HR/ TR Eccentric x 20      Seated x 20 ea 15# Seated x 20 ea 15# Standing 20x  Standing x 20  Standing x 30 ea     eccentric x 20    Pro stretch  Standing 3 x 30      Seated 3 x 30\" Seated 3 x 30\"    Standing 3 x 30\"     3x30\" seated   3x30\" standing    LP      nv 95# x 20  125# x 30  125# x 30                                 Ther Activity 4/21 3/10 3/12 3/24 3/26 3/31 4/2 4/7  4/9  4/14   Bike  TM x 6'      5' ROM  6' ROM  6' ROM 6' ROM L2 x 6' ROM  L3 x 6'   elliptical x 6'    Patient education  KR  ND GF KR KR KR gait            Monster walks  BTB x 5 laps            GTB x 5 laps       GTB x 5 laps     RE                 KR  KR   foto        X        X     Modalities  3/10 3/12     3/31 4/2 4/7       CP prn                                                     "

## 2025-04-23 ENCOUNTER — OFFICE VISIT (OUTPATIENT)
Dept: PHYSICAL THERAPY | Facility: CLINIC | Age: 40
End: 2025-04-23
Payer: OTHER MISCELLANEOUS

## 2025-04-23 DIAGNOSIS — Z98.890 STATUS POST OPEN REDUCTION WITH INTERNAL FIXATION (ORIF) OF FRACTURE OF ANKLE: Primary | ICD-10-CM

## 2025-04-23 DIAGNOSIS — Z87.81 STATUS POST OPEN REDUCTION WITH INTERNAL FIXATION (ORIF) OF FRACTURE OF ANKLE: Primary | ICD-10-CM

## 2025-04-23 PROCEDURE — 97110 THERAPEUTIC EXERCISES: CPT

## 2025-04-23 PROCEDURE — 97112 NEUROMUSCULAR REEDUCATION: CPT

## 2025-04-23 PROCEDURE — 97530 THERAPEUTIC ACTIVITIES: CPT

## 2025-04-23 NOTE — PROGRESS NOTES
"Daily Note     Today's date: 2025  Patient name: Matthew Simeon  : 1985  MRN: [id number]  Referring provider: Vitor Gunderson,*  Dx:   Encounter Diagnosis     ICD-10-CM    1. Status post open reduction with internal fixation (ORIF) of fracture of ankle  Z98.890     Z87.81                          Subjective: \" It was a little sore\"       Objective: See treatment diary below      Assessment: Matthew presents with ankle pain. Improved mobility noted in the gastroc B. Progressed LE strengthening to tolerance- fatigue noted in the gastroc and quad. Gross gastroc weakness noted with increased fatigue in the form of shaking and decreased eccentric control.  Gross fatigue noted throughout.  Tolerated session without adverse effects. Recommend continued skilled therapy to improve overall strength and mobility for functional return with decreased compensation and pain.        Plan: Continue per plan of care.      Precautions: major depressive disorder, s/p ORIF     DOS 25      Date 4/21 4/23 3/12 3/24 3/26 3/31 4/2 4/7  4/9  4/14   Visit # 11 12 3 4 5 6 7 8  9  10   FOTO         X             Re-eval                 X           Manuals 4/21 4/23 3/12 3/24 3/26 3/31 4/2 4/7  4/9  4/14   PROM to pierce    GF                  IASTM                tennis ball release KR   calf and plantar fascia KR   Edema massage        Scar massage edu              HS str    30\" x 3                  Neuro Re-Ed 4/21 4/23 3/12 3/24 3/26 3/31 4/2 4/7  4/9  4/14   BAPS board      L3 x 20 CW/ CCW x 20  L3 x 20 CW/ CCW x 20              Towel scrunches    2 x 20  2x20  2x20      2' and toe yoga       HS curl   3 x 10  Standing 3 x 10  nv             Marching laps  Suitcase on foam 15# x 5 laps  Suitcase on foam 15# x 5 laps        X 5 laps  Suitcase x 5 laps  X 3 laps foam        Rockerboard            X20 fwd/ lat  X20 fwd/ lat   x20 fwd single leg tap      LAQ   3\" 3 x 10                  Tandem walking  Foam x 5 laps  Foam x 5 " "laps      X 5 laps  X 5 laps  X 5 laps  Foam x 5 laps        Sidestepping  BTB x 5 laps     X 5 laps  X 5 laps  OTB x 5 laps  GTB x5 laps  GTB x 5 laps     GTB x 5 laps    Standing hip abd  BTB x 20 ea     2x10  nv OTB x 20 B  GTB x 20 B GTB x 20 B     GTB x 30 B    SLS         5x 10\"  5x10\"  5x10\"  R LE   5x10\" R LE      Lunges  X20 B 20 B on bosu            Squats  On bosu x 20  On bosu x 20    2x10  2x10  OTB x 20  GTB x 20  GTB x 20     GTB x 20    Ther Ex 4/21 4/23 3/12 3/24 3/26 3/31 4/2 4/7  4/9  4/14   Ankle AROM (HEP)   X 30 ea         BTB 4 ways 20        SLR 3 ways (HEP)   3 x 10 ea                 Gastroc stretch (HEP) Standing 3 x 30\" slant board  Standning 3x30\" slant  30\" x 3  Slant board 3 x 30\"  Slant board 3 x 30\"  Slant board 3 x 30\"  Slant board 3 x 30\"     slant board 3 x 30\"   slant board 3 x 30\"    Hamstring stretch    30\" x 3                  HR/ TR Eccentric x 20  Eccentric x 20    Seated x 20 ea 15# Seated x 20 ea 15# Standing 20x  Standing x 20  Standing x 30 ea     eccentric x 20    Pro stretch  Standing 3 x 30  Standing 3 x 30\"    Seated 3 x 30\" Seated 3 x 30\"    Standing 3 x 30\"     3x30\" seated   3x30\" standing    LP  145# x 30 DL SL 75# x 30    nv 95# x 20  125# x 30  125# x 30                                Ther Activity 4/21 4/23 3/12 3/24 3/26 3/31 4/2 4/7  4/9  4/14   Bike  TM x 6'  Elliptical 6'    5' ROM  6' ROM  6' ROM 6' ROM L2 x 6' ROM  L3 x 6'   elliptical x 6'    Patient education  KR  KR GF KR KR KR gait            Monster walks  BTB x 5 laps           GTB x 5 laps       GTB x 5 laps     RE                KR  KR   foto       X        X     Modalities   3/12     3/31 4/2 4/7       CP prn                                                   "

## 2025-04-28 ENCOUNTER — OFFICE VISIT (OUTPATIENT)
Dept: PHYSICAL THERAPY | Facility: CLINIC | Age: 40
End: 2025-04-28
Attending: PODIATRIST
Payer: OTHER MISCELLANEOUS

## 2025-04-28 DIAGNOSIS — Z87.81 STATUS POST OPEN REDUCTION WITH INTERNAL FIXATION (ORIF) OF FRACTURE OF ANKLE: Primary | ICD-10-CM

## 2025-04-28 DIAGNOSIS — Z98.890 STATUS POST OPEN REDUCTION WITH INTERNAL FIXATION (ORIF) OF FRACTURE OF ANKLE: Primary | ICD-10-CM

## 2025-04-28 PROCEDURE — 97110 THERAPEUTIC EXERCISES: CPT

## 2025-04-28 PROCEDURE — 97112 NEUROMUSCULAR REEDUCATION: CPT

## 2025-04-28 PROCEDURE — 97530 THERAPEUTIC ACTIVITIES: CPT

## 2025-04-28 NOTE — PROGRESS NOTES
"Daily Note     Today's date: 2025  Patient name: Matthew Simeon  : 1985  MRN: [id number]  Referring provider: Vitor Gunderson,*  Dx:   Encounter Diagnosis     ICD-10-CM    1. Status post open reduction with internal fixation (ORIF) of fracture of ankle  Z98.890     Z87.81                            Subjective: \" I am feeling ok\"     Objective: See treatment diary below      Assessment: Matthew presents with ankle pain.  Progressed LE strengthening and balance today- noted mild fatigue in the ankle noted in the form of shaking. Continued decreased ankle motion noted mostly with df in standing. Tolerated session without adverse effects. Recommend continued skilled therapy to improve overall strength and mobility for functional return with decreased compensation and pain.        Plan: Continue per plan of care.      Precautions: major depressive disorder, s/p ORIF     DOS 25      Date 4/21 4/23 4/28 3/24 3/26 3/31 4/2 4/7  4/9  4/14   Visit # 11 12 13 4 5 6 7 8  9  10   FOTO        X             Re-eval                X           Manuals 4/21 4/23 4/28 3/24 3/26 3/31 4/2 4/7  4/9  4/14   PROM to pierce                     IASTM               tennis ball release KR   calf and plantar fascia KR   Edema massage       Scar massage edu              HS str                     Neuro Re-Ed 4/21 4/23 4/28 3/24 3/26 3/31 4/2 4/7  4/9  4/14   BAPS board     L3 x 20 CW/ CCW x 20  L3 x 20 CW/ CCW x 20              Towel scrunches     2x20  2x20      2' and toe yoga       Standing lowering 3 ways SLS   X10   nv             Marching laps  Suitcase on foam 15# x 5 laps  Suitcase on foam 15# x 5 laps  Suitcase on foam 15# x 5 laps      X 5 laps  Suitcase x 5 laps  X 3 laps foam        Rockerboard    SL x 30 taps        X20 fwd/ lat  X20 fwd/ lat   x20 fwd single leg tap      LAQ                    Tandem walking  Foam x 5 laps  Foam x 5 laps  Foam x 5 laps    X 5 laps  X 5 laps  X 5 laps  Foam x 5 laps      " "  Sidestepping  BTB x 5 laps   BTB x 5 laps  X 5 laps  X 5 laps  OTB x 5 laps  GTB x5 laps  GTB x 5 laps     GTB x 5 laps    Standing hip abd  BTB x 20 ea   BTB x 30 ea  2x10  nv OTB x 20 B  GTB x 20 B GTB x 20 B     GTB x 30 B    SLS   15# SL DL x 20      5x 10\"  5x10\"  5x10\"  R LE   5x10\" R LE      Lunges  X20 B 20 B on bosu  20 B on bosu           Squats  On bosu x 20  On bosu x 20  On bosu x 20  2x10  2x10  OTB x 20  GTB x 20  GTB x 20     GTB x 20    Ther Ex 4/21 4/23 4/28 3/24 3/26 3/31 4/2 4/7  4/9  4/14   Ankle AROM (HEP)            BTB 4 ways 20        SLR 3 ways (HEP)                    Gastroc stretch (HEP) Standing 3 x 30\" slant board  Standning 3x30\" slant  Standing 3 x 30\"  Slant board 3 x 30\"  Slant board 3 x 30\"  Slant board 3 x 30\"  Slant board 3 x 30\"     slant board 3 x 30\"   slant board 3 x 30\"    Hamstring stretch                     HR/ TR Eccentric x 20  Eccentric x 20  Eccentric x 20 on step  Seated x 20 ea 15# Seated x 20 ea 15# Standing 20x  Standing x 20  Standing x 30 ea     eccentric x 20    Pro stretch  Standing 3 x 30  Standing 3 x 30\"  Standing 3x30\"  Seated 3 x 30\" Seated 3 x 30\"    Standing 3 x 30\"     3x30\" seated   3x30\" standing    LP  145# x 30 DL SL 75# x 30  nv nv 95# x 20  125# x 30  125# x 30                               Ther Activity 4/21 4/23 4/28 3/24 3/26 3/31 4/2 4/7  4/9  4/14   Bike  TM x 6'  Elliptical 6'  Elliptical 6'  5' ROM  6' ROM  6' ROM 6' ROM L2 x 6' ROM  L3 x 6'   elliptical x 6'    Patient education  KR  KR KR KR KR KR gait            Monster walks  BTB x 5 laps   BTB x 5 laps        GTB x 5 laps       GTB x 5 laps     RE               KR  KR   foto      X        72 d/c     Modalities        3/31 4/2 4/7       CP prn                                                 "

## 2025-05-01 ENCOUNTER — OFFICE VISIT (OUTPATIENT)
Dept: PHYSICAL THERAPY | Facility: CLINIC | Age: 40
End: 2025-05-01
Attending: PODIATRIST
Payer: OTHER MISCELLANEOUS

## 2025-05-01 DIAGNOSIS — Z87.81 STATUS POST OPEN REDUCTION WITH INTERNAL FIXATION (ORIF) OF FRACTURE OF ANKLE: Primary | ICD-10-CM

## 2025-05-01 DIAGNOSIS — Z98.890 STATUS POST OPEN REDUCTION WITH INTERNAL FIXATION (ORIF) OF FRACTURE OF ANKLE: Primary | ICD-10-CM

## 2025-05-01 PROCEDURE — 97530 THERAPEUTIC ACTIVITIES: CPT

## 2025-05-01 PROCEDURE — 97110 THERAPEUTIC EXERCISES: CPT

## 2025-05-01 PROCEDURE — 97112 NEUROMUSCULAR REEDUCATION: CPT

## 2025-05-01 NOTE — PROGRESS NOTES
"Daily Note     Today's date: 2025  Patient name: Matthew Simeon  : 1985  MRN: [id number]  Referring provider: Vitor Gunderson,*  Dx:   Encounter Diagnosis     ICD-10-CM    1. Status post open reduction with internal fixation (ORIF) of fracture of ankle  Z98.890     Z87.81                   Start Time: 930          Subjective: \" I am feeling ok\"     Objective: See treatment diary below      Assessment: Matthew presents with ankle pain.  Improved balance reaction on the ankle with mild fatigue in the LE in the form of shaking. Continued shaking in the R LE with SLS.  Progressed jumping and dynamic exercises to tolerance. Tolerated session without adverse effects. Recommend continued skilled therapy to improve overall strength and mobility for functional return with decreased compensation and pain.        Plan: Continue per plan of care.      Precautions: major depressive disorder, s/p ORIF     DOS 25      Date 4/21 4/23 4/28 5/1 3/26 3/31 4/2 4/7  4/9  4/14   Visit # 11 12 13 14 5 6 7 8  9  10   FOTO        X             Re-eval                X           Manuals 4/21 4/23 4/28 5/1 3/26 3/31 4/2 4/7  4/9  4/14   PROM to pierce                    IASTM              tennis ball release KR   calf and plantar fascia KR   Edema massage      Scar massage edu              HS str                    Neuro Re-Ed 4/21 4/23 4/28 5/1 3/26 3/31 4/2 4/7  4/9  4/14   BAPS board      L3 x 20 CW/ CCW x 20              Towel scrunches      2x20      2' and toe yoga       Standing lowering 3 ways SLS   X10   nv             Marching laps  Suitcase on foam 15# x 5 laps  Suitcase on foam 15# x 5 laps  Suitcase on foam 15# x 5 laps  Suitcase 15# x 5 laps on foam    X 5 laps  Suitcase x 5 laps  X 3 laps foam        Rockerboard    SL x 30 taps       X20 fwd/ lat  X20 fwd/ lat   x20 fwd single leg tap      LAQ                   Tandem walking  Foam x 5 laps  Foam x 5 laps  Foam x 5 laps  Foam x 5 lap  X 5 laps  X 5 laps  X 5 " "laps  Foam x 5 laps        Sidestepping  BTB x 5 laps   BTB x 5 laps   X 5 laps  OTB x 5 laps  GTB x5 laps  GTB x 5 laps     GTB x 5 laps    Standing hip abd  BTB x 20 ea   BTB x 30 ea   nv OTB x 20 B  GTB x 20 B GTB x 20 B     GTB x 30 B    SLS   15# SL DL x 20  15# SL DL x 20    5x 10\"  5x10\"  5x10\"  R LE   5x10\" R LE      Lunges  X20 B 20 B on bosu  20 B on bosu  On bosu and reverse  x 20 ea          Squats  On bosu x 20  On bosu x 20  On bosu x 20  On bosu x 30  2x10  OTB x 20  GTB x 20  GTB x 20     GTB x 20    Ther Ex 4/21 4/23 4/28 5/1 3/26 3/31 4/2 4/7  4/9  4/14   Ankle AROM (HEP)           BTB 4 ways 20        SLR 3 ways (HEP)                   Gastroc stretch (HEP) Standing 3 x 30\" slant board  Standning 3x30\" slant  Standing 3 x 30\"  Slant board 3 x 30\"  Slant board 3 x 30\"  Slant board 3 x 30\"  Slant board 3 x 30\"     slant board 3 x 30\"   slant board 3 x 30\"    Hamstring stretch                    HR/ TR Eccentric x 20  Eccentric x 20  Eccentric x 20 on step  Eccentric on step x 20  Seated x 20 ea 15# Standing 20x  Standing x 20  Standing x 30 ea     eccentric x 20    Pro stretch  Standing 3 x 30  Standing 3 x 30\"  Standing 3x30\"  Standing 3 x 30\"  Seated 3 x 30\"    Standing 3 x 30\"     3x30\" seated   3x30\" standing    LP  145# x 30 DL SL 75# x 30  nv  95# x 20  125# x 30  125# x 30                              Ther Activity 4/21 4/23 4/28 5/1 3/26 3/31 4/2 4/7  4/9  4/14   Bike  TM x 6'  Elliptical 6'  Elliptical 6'  Elliptical x 6'  6' ROM  6' ROM 6' ROM L2 x 6' ROM  L3 x 6'   elliptical x 6'    Patient education  KR  KR KR  KR KR gait            Box push     X 5 laps          Monster walks  BTB x 5 laps   BTB x 5 laps       GTB x 5 laps       GTB x 5 laps    Agility ladder     Diagonal navigation  and step in x 5 laps           RE              KR  KR   foto     X        72 d/c     Modalities       3/31 4/2 4/7       CP prn                                               "

## 2025-05-05 ENCOUNTER — APPOINTMENT (OUTPATIENT)
Dept: PHYSICAL THERAPY | Facility: CLINIC | Age: 40
End: 2025-05-05
Attending: PODIATRIST
Payer: OTHER MISCELLANEOUS

## 2025-05-05 NOTE — PROGRESS NOTES
"Daily Note     Today's date: 2025  Patient name: Matthew Simeon  : 1985  MRN: [id number]  Referring provider: Vitor Gunderson,*  Dx:   No diagnosis found.                         Subjective: \"     Objective: See treatment diary below      Assessment: Matthew presents with ankle pain. Tolerated session without adverse effects. Recommend continued skilled therapy to improve overall strength and mobility for functional return with decreased compensation and pain.        Plan: Continue per plan of care.      Precautions: major depressive disorder, s/p ORIF     DOS 25      Date 4/21 4/23 4/28 5/1 5/5 3/31 4/2 4/7  4/9  4/14   Visit # 11 12 13 14 15 6 7 8  9  10   FOTO                    Re-eval                X           Manuals 4/21 4/23 4/28 5/1  3/31 4/2 4/7  4/9  4/14   PROM to pierce                   IASTM             tennis ball release KR   calf and plantar fascia KR   Edema massage                   HS str                   Neuro Re-Ed 4/21 4/23 4/28 5/1  3/31 4/2 4/7  4/9  4/14   BAPS board                   Towel scrunches           2' and toe yoga       Standing lowering 3 ways SLS   X10                Marching laps  Suitcase on foam 15# x 5 laps  Suitcase on foam 15# x 5 laps  Suitcase on foam 15# x 5 laps  Suitcase 15# x 5 laps on foam   X 5 laps  Suitcase x 5 laps  X 3 laps foam        Rockerboard    SL x 30 taps      X20 fwd/ lat  X20 fwd/ lat   x20 fwd single leg tap      LAQ                  Tandem walking  Foam x 5 laps  Foam x 5 laps  Foam x 5 laps  Foam x 5 lap   X 5 laps  X 5 laps  Foam x 5 laps        Sidestepping  BTB x 5 laps   BTB x 5 laps    OTB x 5 laps  GTB x5 laps  GTB x 5 laps     GTB x 5 laps    Standing hip abd  BTB x 20 ea   BTB x 30 ea    OTB x 20 B  GTB x 20 B GTB x 20 B     GTB x 30 B    SLS   15# SL DL x 20  15# SL DL x 20   5x 10\"  5x10\"  5x10\"  R LE   5x10\" R LE      Lunges  X20 B 20 B on bosu  20 B on bosu  On bosu and reverse  x 20 ea          Squats  On bosu x " "20  On bosu x 20  On bosu x 20  On bosu x 30   OTB x 20  GTB x 20  GTB x 20     GTB x 20    Ther Ex 4/21 4/23 4/28 5/1  3/31 4/2 4/7  4/9  4/14   Ankle AROM (HEP)          BTB 4 ways 20        SLR 3 ways (HEP)                  Gastroc stretch (HEP) Standing 3 x 30\" slant board  Standning 3x30\" slant  Standing 3 x 30\"  Slant board 3 x 30\"   Slant board 3 x 30\"  Slant board 3 x 30\"     slant board 3 x 30\"   slant board 3 x 30\"    Hamstring stretch                   HR/ TR Eccentric x 20  Eccentric x 20  Eccentric x 20 on step  Eccentric on step x 20   Standing 20x  Standing x 20  Standing x 30 ea     eccentric x 20    Pro stretch  Standing 3 x 30  Standing 3 x 30\"  Standing 3x30\"  Standing 3 x 30\"     Standing 3 x 30\"     3x30\" seated   3x30\" standing    LP  145# x 30 DL SL 75# x 30  nv   125# x 30  125# x 30                             Ther Activity 4/21 4/23 4/28 5/1  3/31 4/2 4/7  4/9  4/14   Bike  TM x 6'  Elliptical 6'  Elliptical 6'  Elliptical x 6'   6' ROM 6' ROM L2 x 6' ROM  L3 x 6'   elliptical x 6'    Patient education  KR  KR KR   MARK gait            Box push     X 5 laps          Monster walks  BTB x 5 laps   BTB x 5 laps      GTB x 5 laps       GTB x 5 laps    Agility ladder     Diagonal navigation  and step in x 5 laps           RE             KR  MARK   foto             73 d/c     Modalities      3/31 4/2 4/7       CP prn                                             "

## 2025-05-07 ENCOUNTER — APPOINTMENT (OUTPATIENT)
Dept: PHYSICAL THERAPY | Facility: CLINIC | Age: 40
End: 2025-05-07
Attending: PODIATRIST
Payer: OTHER MISCELLANEOUS

## 2025-05-14 ENCOUNTER — APPOINTMENT (OUTPATIENT)
Dept: PHYSICAL THERAPY | Facility: CLINIC | Age: 40
End: 2025-05-14
Attending: PODIATRIST
Payer: OTHER MISCELLANEOUS

## 2025-05-14 NOTE — PROGRESS NOTES
"Daily Note     Today's date: 2025  Patient name: Matthew Simeon  : 1985  MRN: [id number]  Referring provider: Vitor Gunderson,*  Dx:   No diagnosis found.                         Subjective: \"     Objective: See treatment diary below      Assessment: Matthew presents with ankle pain. Tolerated session without adverse effects. Recommend continued skilled therapy to improve overall strength and mobility for functional return with decreased compensation and pain.        Plan: Continue per plan of care.      Precautions: major depressive disorder, s/p ORIF     DOS 25      Date 4/21 4/23 4/28 5/1 5/5 3/31 4/2 4/7  4/9  4/14   Visit # 11 12 13 14 15 6 7 8  9  10   FOTO                    Re-eval                X           Manuals 4/21 4/23 4/28 5/1  3/31 4/2 4/7  4/9  4/14   PROM to pierce                   IASTM             tennis ball release KR   calf and plantar fascia KR   Edema massage                   HS str                   Neuro Re-Ed 4/21 4/23 4/28 5/1  3/31 4/2 4/7  4/9  4/14   BAPS board                   Towel scrunches           2' and toe yoga       Standing lowering 3 ways SLS   X10                Marching laps  Suitcase on foam 15# x 5 laps  Suitcase on foam 15# x 5 laps  Suitcase on foam 15# x 5 laps  Suitcase 15# x 5 laps on foam   X 5 laps  Suitcase x 5 laps  X 3 laps foam        Rockerboard    SL x 30 taps      X20 fwd/ lat  X20 fwd/ lat   x20 fwd single leg tap      LAQ                  Tandem walking  Foam x 5 laps  Foam x 5 laps  Foam x 5 laps  Foam x 5 lap   X 5 laps  X 5 laps  Foam x 5 laps        Sidestepping  BTB x 5 laps   BTB x 5 laps    OTB x 5 laps  GTB x5 laps  GTB x 5 laps     GTB x 5 laps    Standing hip abd  BTB x 20 ea   BTB x 30 ea    OTB x 20 B  GTB x 20 B GTB x 20 B     GTB x 30 B    SLS   15# SL DL x 20  15# SL DL x 20   5x 10\"  5x10\"  5x10\"  R LE   5x10\" R LE      Lunges  X20 B 20 B on bosu  20 B on bosu  On bosu and reverse  x 20 ea          Squats  On bosu x " "20  On bosu x 20  On bosu x 20  On bosu x 30   OTB x 20  GTB x 20  GTB x 20     GTB x 20    Ther Ex 4/21 4/23 4/28 5/1  3/31 4/2 4/7  4/9  4/14   Ankle AROM (HEP)          BTB 4 ways 20        SLR 3 ways (HEP)                  Gastroc stretch (HEP) Standing 3 x 30\" slant board  Standning 3x30\" slant  Standing 3 x 30\"  Slant board 3 x 30\"   Slant board 3 x 30\"  Slant board 3 x 30\"     slant board 3 x 30\"   slant board 3 x 30\"    Hamstring stretch                   HR/ TR Eccentric x 20  Eccentric x 20  Eccentric x 20 on step  Eccentric on step x 20   Standing 20x  Standing x 20  Standing x 30 ea     eccentric x 20    Pro stretch  Standing 3 x 30  Standing 3 x 30\"  Standing 3x30\"  Standing 3 x 30\"     Standing 3 x 30\"     3x30\" seated   3x30\" standing    LP  145# x 30 DL SL 75# x 30  nv   125# x 30  125# x 30                             Ther Activity 4/21 4/23 4/28 5/1  3/31 4/2 4/7  4/9  4/14   Bike  TM x 6'  Elliptical 6'  Elliptical 6'  Elliptical x 6'   6' ROM 6' ROM L2 x 6' ROM  L3 x 6'   elliptical x 6'    Patient education  KR  KR KR   MARK gait            Box push     X 5 laps          Monster walks  BTB x 5 laps   BTB x 5 laps      GTB x 5 laps       GTB x 5 laps    Agility ladder     Diagonal navigation  and step in x 5 laps           RE             KR  MARK   foto             73 d/c     Modalities      3/31 4/2 4/7       CP prn                                             "

## 2025-05-15 ENCOUNTER — APPOINTMENT (OUTPATIENT)
Dept: PHYSICAL THERAPY | Facility: CLINIC | Age: 40
End: 2025-05-15
Attending: PODIATRIST
Payer: OTHER MISCELLANEOUS

## 2025-05-15 NOTE — PROGRESS NOTES
"Daily Note     Today's date: 5/15/2025  Patient name: Matthew Simeon  : 1985  MRN: [id number]  Referring provider: Vitor Gunderson,*  Dx:   No diagnosis found.                         Subjective: \"     Objective: See treatment diary below      Assessment: Matthew presents with ankle pain. Tolerated session without adverse effects. Recommend continued skilled therapy to improve overall strength and mobility for functional return with decreased compensation and pain.        Plan: Continue per plan of care.      Precautions: major depressive disorder, s/p ORIF     DOS 25      Date 4/21 4/23 4/28 5/1 5/5 3/31 4/2 4/7  4/9  4/14   Visit # 11 12 13 14 15 6 7 8  9  10   FOTO                    Re-eval                X           Manuals 4/21 4/23 4/28 5/1  3/31 4/2 4/7  4/9  4/14   PROM to pierce                   IASTM             tennis ball release KR   calf and plantar fascia KR   Edema massage                   HS str                   Neuro Re-Ed 4/21 4/23 4/28 5/1  3/31 4/2 4/7  4/9  4/14   BAPS board                   Towel scrunches           2' and toe yoga       Standing lowering 3 ways SLS   X10                Marching laps  Suitcase on foam 15# x 5 laps  Suitcase on foam 15# x 5 laps  Suitcase on foam 15# x 5 laps  Suitcase 15# x 5 laps on foam   X 5 laps  Suitcase x 5 laps  X 3 laps foam        Rockerboard    SL x 30 taps      X20 fwd/ lat  X20 fwd/ lat   x20 fwd single leg tap      LAQ                  Tandem walking  Foam x 5 laps  Foam x 5 laps  Foam x 5 laps  Foam x 5 lap   X 5 laps  X 5 laps  Foam x 5 laps        Sidestepping  BTB x 5 laps   BTB x 5 laps    OTB x 5 laps  GTB x5 laps  GTB x 5 laps     GTB x 5 laps    Standing hip abd  BTB x 20 ea   BTB x 30 ea    OTB x 20 B  GTB x 20 B GTB x 20 B     GTB x 30 B    SLS   15# SL DL x 20  15# SL DL x 20   5x 10\"  5x10\"  5x10\"  R LE   5x10\" R LE      Lunges  X20 B 20 B on bosu  20 B on bosu  On bosu and reverse  x 20 ea          Squats  On bosu x " "20  On bosu x 20  On bosu x 20  On bosu x 30   OTB x 20  GTB x 20  GTB x 20     GTB x 20    Ther Ex 4/21 4/23 4/28 5/1  3/31 4/2 4/7  4/9  4/14   Ankle AROM (HEP)          BTB 4 ways 20        SLR 3 ways (HEP)                  Gastroc stretch (HEP) Standing 3 x 30\" slant board  Standning 3x30\" slant  Standing 3 x 30\"  Slant board 3 x 30\"   Slant board 3 x 30\"  Slant board 3 x 30\"     slant board 3 x 30\"   slant board 3 x 30\"    Hamstring stretch                   HR/ TR Eccentric x 20  Eccentric x 20  Eccentric x 20 on step  Eccentric on step x 20   Standing 20x  Standing x 20  Standing x 30 ea     eccentric x 20    Pro stretch  Standing 3 x 30  Standing 3 x 30\"  Standing 3x30\"  Standing 3 x 30\"     Standing 3 x 30\"     3x30\" seated   3x30\" standing    LP  145# x 30 DL SL 75# x 30  nv   125# x 30  125# x 30                             Ther Activity 4/21 4/23 4/28 5/1  3/31 4/2 4/7  4/9  4/14   Bike  TM x 6'  Elliptical 6'  Elliptical 6'  Elliptical x 6'   6' ROM 6' ROM L2 x 6' ROM  L3 x 6'   elliptical x 6'    Patient education  KR  KR KR   MARK gait            Box push     X 5 laps          Monster walks  BTB x 5 laps   BTB x 5 laps      GTB x 5 laps       GTB x 5 laps    Agility ladder     Diagonal navigation  and step in x 5 laps           RE             KR  MARK   foto             73 d/c     Modalities      3/31 4/2 4/7       CP prn                                             "

## 2025-05-21 ENCOUNTER — APPOINTMENT (OUTPATIENT)
Dept: PHYSICAL THERAPY | Facility: CLINIC | Age: 40
End: 2025-05-21
Attending: PODIATRIST
Payer: OTHER MISCELLANEOUS

## 2025-05-21 NOTE — PROGRESS NOTES
"Daily Note     Today's date: 2025  Patient name: Matthew Simeon  : 1985  MRN: [id number]  Referring provider: Vitor Gunderson,*  Dx:   No diagnosis found.                         Subjective: \"     Objective: See treatment diary below      Assessment: Matthew presents with ankle pain. Tolerated session without adverse effects. Recommend continued skilled therapy to improve overall strength and mobility for functional return with decreased compensation and pain.        Plan: Continue per plan of care.      Precautions: major depressive disorder, s/p ORIF     DOS 25      Date 4/21 4/23 4/28 5/1 5/5 3/31 4/2 4/7  4/9  4/14   Visit # 11 12 13 14 15 6 7 8  9  10   FOTO                    Re-eval                X           Manuals 4/21 4/23 4/28 5/1  3/31 4/2 4/7  4/9  4/14   PROM to pierce                   IASTM             tennis ball release KR   calf and plantar fascia KR   Edema massage                   HS str                   Neuro Re-Ed 4/21 4/23 4/28 5/1  3/31 4/2 4/7  4/9  4/14   BAPS board                   Towel scrunches           2' and toe yoga       Standing lowering 3 ways SLS   X10                Marching laps  Suitcase on foam 15# x 5 laps  Suitcase on foam 15# x 5 laps  Suitcase on foam 15# x 5 laps  Suitcase 15# x 5 laps on foam   X 5 laps  Suitcase x 5 laps  X 3 laps foam        Rockerboard    SL x 30 taps      X20 fwd/ lat  X20 fwd/ lat   x20 fwd single leg tap      LAQ                  Tandem walking  Foam x 5 laps  Foam x 5 laps  Foam x 5 laps  Foam x 5 lap   X 5 laps  X 5 laps  Foam x 5 laps        Sidestepping  BTB x 5 laps   BTB x 5 laps    OTB x 5 laps  GTB x5 laps  GTB x 5 laps     GTB x 5 laps    Standing hip abd  BTB x 20 ea   BTB x 30 ea    OTB x 20 B  GTB x 20 B GTB x 20 B     GTB x 30 B    SLS   15# SL DL x 20  15# SL DL x 20   5x 10\"  5x10\"  5x10\"  R LE   5x10\" R LE      Lunges  X20 B 20 B on bosu  20 B on bosu  On bosu and reverse  x 20 ea          Squats  On bosu x " "20  On bosu x 20  On bosu x 20  On bosu x 30   OTB x 20  GTB x 20  GTB x 20     GTB x 20    Ther Ex 4/21 4/23 4/28 5/1  3/31 4/2 4/7  4/9  4/14   Ankle AROM (HEP)          BTB 4 ways 20        SLR 3 ways (HEP)                  Gastroc stretch (HEP) Standing 3 x 30\" slant board  Standning 3x30\" slant  Standing 3 x 30\"  Slant board 3 x 30\"   Slant board 3 x 30\"  Slant board 3 x 30\"     slant board 3 x 30\"   slant board 3 x 30\"    Hamstring stretch                   HR/ TR Eccentric x 20  Eccentric x 20  Eccentric x 20 on step  Eccentric on step x 20   Standing 20x  Standing x 20  Standing x 30 ea     eccentric x 20    Pro stretch  Standing 3 x 30  Standing 3 x 30\"  Standing 3x30\"  Standing 3 x 30\"     Standing 3 x 30\"     3x30\" seated   3x30\" standing    LP  145# x 30 DL SL 75# x 30  nv   125# x 30  125# x 30                             Ther Activity 4/21 4/23 4/28 5/1  3/31 4/2 4/7  4/9  4/14   Bike  TM x 6'  Elliptical 6'  Elliptical 6'  Elliptical x 6'   6' ROM 6' ROM L2 x 6' ROM  L3 x 6'   elliptical x 6'    Patient education  KR  KR KR   MARK gait            Box push     X 5 laps          Monster walks  BTB x 5 laps   BTB x 5 laps      GTB x 5 laps       GTB x 5 laps    Agility ladder     Diagonal navigation  and step in x 5 laps           RE             KR  MARK   foto             72 d/c     Modalities      3/31 4/2 4/7       CP prn                                             "

## 2025-05-22 DIAGNOSIS — A60.00 HERPES SIMPLEX INFECTION OF GENITOURINARY SYSTEM: ICD-10-CM

## 2025-05-22 RX ORDER — VALACYCLOVIR HYDROCHLORIDE 500 MG/1
500 TABLET, FILM COATED ORAL 2 TIMES DAILY
Qty: 20 TABLET | Refills: 0 | Status: SHIPPED | OUTPATIENT
Start: 2025-05-22 | End: 2025-06-21

## 2025-05-22 NOTE — TELEPHONE ENCOUNTER
Reason for call:   [x] Refill   [] Prior Auth  [] Other:     Office:   [x] PCP/Provider -   [] Specialty/Provider -     Medication: valACYclovir (VALTREX) 500 mg tablet ( Take 1 tablet (500 mg total) by mouth 2 (two) times a day       Pharmacy: [facility]/pharmacy #[id number] - [facility] - [address] [place] [address] Pharmacy   Does the patient have enough for 3 days?   [x] Yes   [] No - Send as HP to POD    Mail Away Pharmacy   Does the patient have enough for 10 days?   [] Yes   [] No - Send as HP to POD

## 2025-06-17 ENCOUNTER — TELEPHONE (OUTPATIENT)
Dept: PODIATRY | Facility: CLINIC | Age: 40
End: 2025-06-17

## 2025-07-17 ENCOUNTER — OFFICE VISIT (OUTPATIENT)
Dept: URGENT CARE | Age: 40
End: 2025-07-17
Payer: COMMERCIAL

## 2025-07-17 VITALS
BODY MASS INDEX: 30.46 KG/M2 | WEIGHT: 218.4 LBS | HEART RATE: 66 BPM | DIASTOLIC BLOOD PRESSURE: 74 MMHG | RESPIRATION RATE: 18 BRPM | OXYGEN SATURATION: 99 % | TEMPERATURE: 97.8 F | SYSTOLIC BLOOD PRESSURE: 118 MMHG

## 2025-07-17 DIAGNOSIS — H10.9 CONJUNCTIVITIS OF BOTH EYES, UNSPECIFIED CONJUNCTIVITIS TYPE: Primary | ICD-10-CM

## 2025-07-17 PROCEDURE — S9083 URGENT CARE CENTER GLOBAL: HCPCS

## 2025-07-17 PROCEDURE — G0382 LEV 3 HOSP TYPE B ED VISIT: HCPCS

## 2025-07-17 RX ORDER — OFLOXACIN 3 MG/ML
1 SOLUTION/ DROPS OPHTHALMIC 4 TIMES DAILY
Qty: 5 ML | Refills: 0 | Status: SHIPPED | OUTPATIENT
Start: 2025-07-17 | End: 2025-07-22

## 2025-07-17 NOTE — PATIENT INSTRUCTIONS
Use the eye drops in both eyes    Wash your hands frequently       Pink Eye is contagious until 24 hours with the antibiotic gtts

## 2025-07-17 NOTE — PROGRESS NOTES
[facility]        NAME: Matthew Simeon is a 39 y.o. male  : 1985    MRN: [id number]  DATE: 2025  TIME: 9:58 AM    Assessment and Plan   Conjunctivitis of both eyes, unspecified conjunctivitis type [H10.9]  1. Conjunctivitis of both eyes, unspecified conjunctivitis type  ofloxacin (OCUFLOX) 0.3 % ophthalmic solution            Patient Instructions   Use the eye drops in both eyes    Wash your hands frequently       Pink Eye is contagious until 24 hours with the antibiotic gtts    Follow up with PCP in 3-5 days.  Proceed to  ER if symptoms worsen.    If tests have been performed at [facility], our office will contact you with results if changes need to be made to the care plan discussed with you at the visit.  [facility]    Chief Complaint     Chief Complaint   Patient presents with    Eye Problem     Started this morning with right eye itchiness and irritation, has now moved to left eye as well, reports intermittent drainage from both eyes         History of Present Illness       This is a 39 year old male who presents with itching and discharge from both eyes.  He did have redness in the R eye this AM. No congestion    Eye Problem   Associated symptoms include an eye discharge, eye redness and itching. Pertinent negatives include no photophobia.       Review of Systems   Review of Systems   Constitutional: Negative.    HENT: Negative.     Eyes:  Positive for discharge, redness and itching. Negative for photophobia, pain and visual disturbance.   Respiratory: Negative.     Cardiovascular: Negative.    Gastrointestinal: Negative.    Genitourinary: Negative.    Musculoskeletal: Negative.    Neurological: Negative.          Current Medications     Current Medications[1]    Current Allergies     Allergies as of 2025    (No Known Allergies)            The following portions of the patient's history were reviewed and updated as appropriate:  allergies, current medications, past family history, past medical history, past social history, past surgical history and problem list.     Past Medical History[2]    Past Surgical History[3]    Family History[4]      Medications have been verified.        Objective   /74 (BP Location: Right arm, Patient Position: Sitting)   Pulse 66   Temp 97.8 °F (36.6 °C) (Tympanic)   Resp 18   Wt 99.1 kg (218 lb 6.4 oz)   SpO2 99%   BMI 30.46 kg/m²   No LMP for male patient.       Physical Exam     Physical Exam  Vitals reviewed.   Constitutional:       General: He is not in acute distress.     Appearance: Normal appearance. He is not ill-appearing.   HENT:      Head: Normocephalic and atraumatic.     Eyes:      Extraocular Movements: Extraocular movements intact.      Conjunctiva/sclera: Conjunctivae normal.      Comments: Bilateral itching of the eyes. Slight redness noted and no discharge noted.  Pupils equal and reactive.   No visual changes   Pulmonary:      Effort: Pulmonary effort is normal.     Skin:     General: Skin is warm.     Neurological:      General: No focal deficit present.      Mental Status: He is alert.     Psychiatric:         Mood and Affect: Mood normal.         Behavior: Behavior normal.         Judgment: Judgment normal.                        [1]   Current Outpatient Medications:     ofloxacin (OCUFLOX) 0.3 % ophthalmic solution, Administer 1 drop to both eyes 4 (four) times a day for 5 days, Disp: 5 mL, Rfl: 0    meloxicam (Mobic) 7.5 mg tablet, Take 1 tablet (7.5 mg total) by mouth daily for 50 doses, Disp: 50 tablet, Rfl: 0    nicotine (NICODERM CQ) 14 mg/24hr TD 24 hr patch, Place 1 patch on the skin over 24 hours every 24 hours, Disp: 28 patch, Rfl: 0    valACYclovir (VALTREX) 500 mg tablet, Take 1 tablet (500 mg total) by mouth 2 (two) times a day, Disp: 20 tablet, Rfl: 0  [2]   Past Medical History:  Diagnosis Date    Asthma     Genital herpes    [3]   Past Surgical  History:  Procedure Laterality Date    CHG MANUAL APPL STRESS PHYS/QHP JOINT RADIOGRAPHY Right 2/6/2025    Procedure: Right ankle open reduction with internal fixation of bimalleolar fracture;  Surgeon: Vitor Gunderson DPM;  Location: [facility] MAIN OR;  Service: Podiatry    FRACTURE SURGERY Left     tib/fib    HI OPEN TREATMENT BIMALLEOLAR ANKLE FRACTURE Right 2/6/2025    Procedure: Right ankle open reduction with internal fixation of bimalleolar fracture;  Surgeon: Vitor Gunderson DPM;  Location: [facility] MAIN OR;  Service: Podiatry   [4]   Family History  Problem Relation Name Age of Onset    Cancer Father Sandeep Gaston Sr.         pancreas    Emphysema Maternal Grandfather      Cancer Paternal Grandfather Kenan Simeon Sr.         prostate    Diabetes Paternal Grandfather Kenan Simeon Sr.

## 2025-07-17 NOTE — LETTER
July 17, 2025     Patient: Matthew Simeon   YOB: 1985   Date of Visit: 7/17/2025       To Whom It May Concern:    It is my medical opinion that Matthew Simeon may return to work on 07/18/2025.    If you have any questions or concerns, please don't hesitate to call.         Sincerely,        IVAN Mendoza    CC: No Recipients

## 2025-07-24 DIAGNOSIS — A60.00 HERPES SIMPLEX INFECTION OF GENITOURINARY SYSTEM: ICD-10-CM

## 2025-07-24 RX ORDER — VALACYCLOVIR HYDROCHLORIDE 500 MG/1
500 TABLET, FILM COATED ORAL 2 TIMES DAILY
Qty: 20 TABLET | Refills: 0 | Status: CANCELLED | OUTPATIENT
Start: 2025-07-24 | End: 2025-08-23

## 2025-07-24 NOTE — TELEPHONE ENCOUNTER
Patient called in for a refill of his medication valacylovir 500 mg. Patient stated that he's out of the medication.

## 2025-07-25 ENCOUNTER — TELEPHONE (OUTPATIENT)
Age: 40
End: 2025-07-25

## 2025-07-25 DIAGNOSIS — A60.00 HERPES SIMPLEX INFECTION OF GENITOURINARY SYSTEM: ICD-10-CM

## 2025-07-25 RX ORDER — VALACYCLOVIR HYDROCHLORIDE 500 MG/1
500 TABLET, FILM COATED ORAL 2 TIMES DAILY
Qty: 20 TABLET | Refills: 0 | Status: SHIPPED | OUTPATIENT
Start: 2025-07-25 | End: 2025-08-24

## 2025-07-28 NOTE — TELEPHONE ENCOUNTER
Problem: Occupational Therapy  Goal: Occupational Therapy Goal  Description: Goals to be met by: 02/09     Patient will increase functional independence with ADLs by performing:    UE Dressing with Modified Gillespie.  LE Dressing with Modified Gillespie.  Grooming while standing at sink with Modified Gillespie.  Toileting from toilet with Modified Gillespie for hygiene and clothing management.   Toilet transfer to toilet with Modified Gillespie.  Increased functional strength to WFL for ADLs.    Outcome: Ongoing, Progressing   Pt found in SF w/ signif other in room & agreeable to OT/PT co-eval/tx this date.  Pt c/o R lateral chest pain (chronic 2/2 CA tx) & perf the following:  -sup-->EOB via bed rail w/ SBA & tolerated w/ Sup  -don undergarments at EOB w/ SBA  -standing & fxnl mobility for short distance outside room w/o DME w/ CGA  -returned to supine w/ SBA  **noted SOB w/ mvmt & at rest, unresolving w/ PLBing  Edu/tx re: general safety techs, deep/PLBing techs & HEP. Pt/signif other verbalized understanding.    Pt presents w/ decreased overall endurance/conditioning, balance/mobility & coordination w/ subsequent decline in (I)/safety w/ BADLs, fxnl mobility & fxnl t/f's.  OT 5x/wk to increase phys/fxnl status & maximize potential to achieve established goals for d/c-->low intensity tx w/ rec shower chair.     Phone call placed to pt, no answer. LM letting pt know rx was sent to pharmacy and to call us with any questions.

## (undated) DEVICE — ABDOMINAL PAD: Brand: DERMACEA

## (undated) DEVICE — C-ARM: Brand: UNBRANDED

## (undated) DEVICE — ACE WRAP 4 IN UNSTERILE

## (undated) DEVICE — SUT MONOCRYL 4-0 PS-2 27 IN Y426H

## (undated) DEVICE — PADDING CAST 4 IN  COTTON STRL

## (undated) DEVICE — CUFF TOURNIQUET 34 X 4 IN QUICK CONNECT DISP 1BLA

## (undated) DEVICE — CHLORAPREP HI-LITE 26ML ORANGE

## (undated) DEVICE — KERLIX BANDAGE ROLL: Brand: KERLIX

## (undated) DEVICE — GLOVE INDICATOR PI UNDERGLOVE SZ 8.5 BLUE

## (undated) DEVICE — ACE WRAP 6 IN STERILE

## (undated) DEVICE — SUT VICRYL 2-0 SH 27 IN UNDYED J417H

## (undated) DEVICE — 2.7MM CANNULATED DRILL BIT/QC 160MM

## (undated) DEVICE — INTENDED FOR TISSUE SEPARATION, AND OTHER PROCEDURES THAT REQUIRE A SHARP SURGICAL BLADE TO PUNCTURE OR CUT.: Brand: BARD-PARKER ® CARBON RIB-BACK BLADES

## (undated) DEVICE — 2.5MM DRILL BIT/QC/GOLD/110MM

## (undated) DEVICE — GAUZE SPONGES,16 PLY: Brand: CURITY

## (undated) DEVICE — CAST PADDING 4 IN SYNTHETIC NON-STRL

## (undated) DEVICE — BETHLEHEM UNIVERSAL  MIONR EXT: Brand: CARDINAL HEALTH

## (undated) DEVICE — SUT ETHILON 4-0 PS-2 18 IN 1667H

## (undated) DEVICE — 1.25MM NON-THREADED GUIDE WIRE 150MM
Type: IMPLANTABLE DEVICE | Site: ANKLE | Status: NON-FUNCTIONAL
Removed: 2025-02-06

## (undated) DEVICE — PREMIUM DRY TRAY LF: Brand: MEDLINE INDUSTRIES, INC.

## (undated) DEVICE — DRAPE C-ARMOUR

## (undated) DEVICE — SCD SEQUENTIAL COMPRESSION COMFORT SLEEVE MEDIUM KNEE LENGTH: Brand: KENDALL SCD

## (undated) DEVICE — GLOVE SRG BIOGEL 8.5

## (undated) DEVICE — OCCLUSIVE GAUZE STRIP,3% BISMUTH TRIBROMOPHENATE IN PETROLATUM BLEND: Brand: XEROFORM